# Patient Record
Sex: FEMALE | Race: WHITE | NOT HISPANIC OR LATINO | Employment: FULL TIME | URBAN - METROPOLITAN AREA
[De-identification: names, ages, dates, MRNs, and addresses within clinical notes are randomized per-mention and may not be internally consistent; named-entity substitution may affect disease eponyms.]

---

## 2018-05-24 ENCOUNTER — APPOINTMENT (OUTPATIENT)
Dept: LAB | Age: 28
End: 2018-05-24

## 2018-05-24 ENCOUNTER — OFFICE VISIT (OUTPATIENT)
Dept: OCCUPATIONAL MEDICINE | Age: 28
End: 2018-05-24

## 2018-05-24 VITALS
RESPIRATION RATE: 14 BRPM | HEART RATE: 60 BPM | HEIGHT: 66 IN | SYSTOLIC BLOOD PRESSURE: 106 MMHG | BODY MASS INDEX: 25.71 KG/M2 | DIASTOLIC BLOOD PRESSURE: 62 MMHG | WEIGHT: 160 LBS

## 2018-05-24 DIAGNOSIS — Z02.89 VISIT FOR OCCUPATIONAL HEALTH EXAMINATION: ICD-10-CM

## 2018-05-24 DIAGNOSIS — Z02.89 ENCOUNTER FOR OCCUPATIONAL HEALTH EXAMINATION: ICD-10-CM

## 2018-05-24 DIAGNOSIS — Z02.89 HEALTH EXAMINATION OF DEFINED SUBPOPULATION: Primary | ICD-10-CM

## 2018-05-24 PROCEDURE — OH053 WHISPER TEST PERFORMED IN OCC HEALTH: Performed by: PHYSICIAN ASSISTANT

## 2018-05-24 PROCEDURE — OH044 DRUG SCREEN HAIR COLLECTION ONLY: Performed by: PHYSICIAN ASSISTANT

## 2018-05-24 PROCEDURE — OH138 COMPREHENSIVE EYE EXAM: Performed by: PHYSICIAN ASSISTANT

## 2018-05-24 PROCEDURE — 36415 COLL VENOUS BLD VENIPUNCTURE: CPT | Performed by: PHYSICIAN ASSISTANT

## 2018-05-24 PROCEDURE — OH021 PRE PLACEMENT PHYSICAL EXAM: Performed by: PHYSICIAN ASSISTANT

## 2018-05-31 ENCOUNTER — APPOINTMENT (OUTPATIENT)
Dept: OCCUPATIONAL MEDICINE | Age: 28
End: 2018-05-31

## 2018-09-23 ENCOUNTER — WALK IN (OUTPATIENT)
Dept: URGENT CARE | Age: 28
End: 2018-09-23

## 2018-09-23 VITALS
WEIGHT: 155.87 LBS | HEART RATE: 68 BPM | OXYGEN SATURATION: 97 % | RESPIRATION RATE: 19 BRPM | TEMPERATURE: 98.4 F | HEIGHT: 65 IN | DIASTOLIC BLOOD PRESSURE: 70 MMHG | SYSTOLIC BLOOD PRESSURE: 139 MMHG | BODY MASS INDEX: 25.97 KG/M2

## 2018-09-23 DIAGNOSIS — L03.011 PARONYCHIA OF RIGHT THUMB: Primary | ICD-10-CM

## 2018-09-23 PROCEDURE — 26010 DRAINAGE OF FINGER ABSCESS: CPT | Performed by: FAMILY MEDICINE

## 2018-09-23 PROCEDURE — 99213 OFFICE O/P EST LOW 20 MIN: CPT | Performed by: FAMILY MEDICINE

## 2018-09-23 RX ORDER — CEPHALEXIN 500 MG/1
500 CAPSULE ORAL 4 TIMES DAILY
Qty: 28 CAPSULE | Refills: 0 | Status: SHIPPED | OUTPATIENT
Start: 2018-09-23 | End: 2018-09-30

## 2018-09-26 ENCOUNTER — TELEPHONE (OUTPATIENT)
Dept: TELEHEALTH | Age: 28
End: 2018-09-26

## 2018-12-05 ENCOUNTER — OFFICE VISIT (OUTPATIENT)
Dept: OBGYN | Age: 28
End: 2018-12-05

## 2018-12-05 ENCOUNTER — LAB SERVICES (OUTPATIENT)
Dept: OBGYN | Age: 28
End: 2018-12-05

## 2018-12-05 VITALS
DIASTOLIC BLOOD PRESSURE: 82 MMHG | BODY MASS INDEX: 26.05 KG/M2 | WEIGHT: 156.53 LBS | HEART RATE: 75 BPM | SYSTOLIC BLOOD PRESSURE: 108 MMHG

## 2018-12-05 DIAGNOSIS — Z11.3 SCREENING FOR STDS (SEXUALLY TRANSMITTED DISEASES): ICD-10-CM

## 2018-12-05 DIAGNOSIS — Z01.419 ENCOUNTER FOR GYNECOLOGICAL EXAMINATION WITHOUT ABNORMAL FINDING: Primary | ICD-10-CM

## 2018-12-05 PROCEDURE — 36415 COLL VENOUS BLD VENIPUNCTURE: CPT | Performed by: NURSE PRACTITIONER

## 2018-12-05 PROCEDURE — 87624 HPV HI-RISK TYP POOLED RSLT: CPT | Performed by: INTERNAL MEDICINE

## 2018-12-05 PROCEDURE — 99385 PREV VISIT NEW AGE 18-39: CPT | Performed by: NURSE PRACTITIONER

## 2018-12-05 PROCEDURE — 86803 HEPATITIS C AB TEST: CPT | Performed by: INTERNAL MEDICINE

## 2018-12-05 PROCEDURE — 86695 HERPES SIMPLEX TYPE 1 TEST: CPT | Performed by: INTERNAL MEDICINE

## 2018-12-05 PROCEDURE — 86592 SYPHILIS TEST NON-TREP QUAL: CPT | Performed by: INTERNAL MEDICINE

## 2018-12-05 PROCEDURE — 88175 CYTOPATH C/V AUTO FLUID REDO: CPT | Performed by: PATHOLOGY

## 2018-12-05 PROCEDURE — 87591 N.GONORRHOEAE DNA AMP PROB: CPT | Performed by: INTERNAL MEDICINE

## 2018-12-05 PROCEDURE — 87491 CHLMYD TRACH DNA AMP PROBE: CPT | Performed by: INTERNAL MEDICINE

## 2018-12-05 PROCEDURE — 86696 HERPES SIMPLEX TYPE 2 TEST: CPT | Performed by: INTERNAL MEDICINE

## 2018-12-05 PROCEDURE — 87340 HEPATITIS B SURFACE AG IA: CPT | Performed by: INTERNAL MEDICINE

## 2018-12-05 PROCEDURE — 99000 SPECIMEN HANDLING OFFICE-LAB: CPT | Performed by: NURSE PRACTITIONER

## 2018-12-05 PROCEDURE — 87389 HIV-1 AG W/HIV-1&-2 AB AG IA: CPT | Performed by: INTERNAL MEDICINE

## 2018-12-05 RX ORDER — PHENOL 1.4 %
1 AEROSOL, SPRAY (ML) MUCOUS MEMBRANE AT BEDTIME
COMMUNITY

## 2018-12-05 RX ORDER — DIPHENHYDRAMINE HCL 25 MG
50 CAPSULE ORAL PRN
COMMUNITY

## 2018-12-05 ASSESSMENT — ENCOUNTER SYMPTOMS
CONSTITUTIONAL NEGATIVE: 1
HEMATOLOGIC/LYMPHATIC NEGATIVE: 1
NEUROLOGICAL NEGATIVE: 1
GASTROINTESTINAL NEGATIVE: 1
EYES NEGATIVE: 1
RESPIRATORY NEGATIVE: 1
PSYCHIATRIC NEGATIVE: 1
ENDOCRINE NEGATIVE: 1
ALLERGIC/IMMUNOLOGIC NEGATIVE: 1

## 2018-12-06 ENCOUNTER — TELEPHONE (OUTPATIENT)
Dept: OBGYN | Age: 28
End: 2018-12-06

## 2018-12-06 LAB
C TRACH RRNA SPEC QL NAA+PROBE: NEGATIVE
HBV SURFACE AG SER QL: NEGATIVE
HCV AB SER QL: NEGATIVE
HIV 1+2 AB+HIV1 P24 AG SERPL QL IA: NONREACTIVE
N GONORRHOEA RRNA SPEC QL NAA+PROBE: NEGATIVE
RPR SER QL: NONREACTIVE
SPECIMEN SOURCE: NORMAL

## 2018-12-07 LAB
HSV1 GG IGG SER-ACNC: NEGATIVE
HSV2 GG IGG SER-ACNC: NEGATIVE

## 2018-12-12 ENCOUNTER — E-ADVICE (OUTPATIENT)
Dept: OBGYN | Age: 28
End: 2018-12-12

## 2018-12-12 LAB — HPV16+18+45 E6+E7MRNA CVX NAA+PROBE: NORMAL

## 2018-12-13 ENCOUNTER — TELEPHONE (OUTPATIENT)
Dept: OBGYN | Age: 28
End: 2018-12-13

## 2020-04-14 ENCOUNTER — APPOINTMENT (OUTPATIENT)
Dept: LAB | Age: 30
End: 2020-04-14

## 2020-04-14 ENCOUNTER — LAB SERVICES (OUTPATIENT)
Dept: LAB | Age: 30
End: 2020-04-14

## 2020-04-14 DIAGNOSIS — Z20.822 EXPOSURE TO COVID-19 VIRUS: Primary | ICD-10-CM

## 2020-04-14 DIAGNOSIS — Z20.822 EXPOSURE TO COVID-19 VIRUS: ICD-10-CM

## 2020-04-14 PROCEDURE — U0003 INFECTIOUS AGENT DETECTION BY NUCLEIC ACID (DNA OR RNA); SEVERE ACUTE RESPIRATORY SYNDROME CORONAVIRUS 2 (SARS-COV-2) (CORONAVIRUS DISEASE [COVID-19]), AMPLIFIED PROBE TECHNIQUE, MAKING USE OF HIGH THROUGHPUT TECHNOLOGIES AS DESCRIBED BY CMS-2020-01-R: HCPCS | Performed by: INTERNAL MEDICINE

## 2020-04-15 ENCOUNTER — TELEPHONE (OUTPATIENT)
Dept: OTHER | Age: 30
End: 2020-04-15

## 2020-04-15 LAB
ISOLATION GUIDELINES: NORMAL
SARS-COV-2 RNA RESP QL NAA+PROBE: NOT DETECTED

## 2020-10-28 ENCOUNTER — LAB SERVICES (OUTPATIENT)
Dept: LAB | Age: 30
End: 2020-10-28

## 2020-10-28 ENCOUNTER — EMPLOYEE HEALTH (OUTPATIENT)
Dept: OTHER | Age: 30
End: 2020-10-28

## 2020-10-28 DIAGNOSIS — Z20.822 SUSPECTED COVID-19 VIRUS INFECTION: Primary | ICD-10-CM

## 2020-10-28 DIAGNOSIS — Z20.822 SUSPECTED COVID-19 VIRUS INFECTION: ICD-10-CM

## 2020-10-28 PROCEDURE — U0003 INFECTIOUS AGENT DETECTION BY NUCLEIC ACID (DNA OR RNA); SEVERE ACUTE RESPIRATORY SYNDROME CORONAVIRUS 2 (SARS-COV-2) (CORONAVIRUS DISEASE [COVID-19]), AMPLIFIED PROBE TECHNIQUE, MAKING USE OF HIGH THROUGHPUT TECHNOLOGIES AS DESCRIBED BY CMS-2020-01-R: HCPCS | Performed by: INTERNAL MEDICINE

## 2020-10-29 ENCOUNTER — EMPLOYEE HEALTH (OUTPATIENT)
Dept: OTHER | Age: 30
End: 2020-10-29

## 2020-10-29 LAB
SARS-COV-2 RNA RESP QL NAA+PROBE: NOT DETECTED
SERVICE CMNT-IMP: NORMAL
SERVICE CMNT-IMP: NORMAL

## 2020-12-18 ENCOUNTER — IMMUNIZATION (OUTPATIENT)
Dept: LAB | Age: 30
End: 2020-12-18

## 2020-12-18 DIAGNOSIS — Z23 NEED FOR VACCINATION: Primary | ICD-10-CM

## 2020-12-18 PROCEDURE — 91300 COVID 19 PFIZER-BIONTECH: CPT | Performed by: PREVENTIVE MEDICINE

## 2020-12-18 PROCEDURE — 0001A COVID 19 PFIZER-BIONTECH: CPT | Performed by: PREVENTIVE MEDICINE

## 2021-01-06 ENCOUNTER — IMMUNIZATION (OUTPATIENT)
Dept: LAB | Age: 31
End: 2021-01-06

## 2021-01-06 DIAGNOSIS — Z23 NEED FOR VACCINATION: Primary | ICD-10-CM

## 2021-01-06 PROCEDURE — 0002A COVID 19 PFIZER-BIONTECH: CPT

## 2021-01-06 PROCEDURE — 91300 COVID 19 PFIZER-BIONTECH: CPT

## 2021-03-24 ENCOUNTER — EMPLOYEE HEALTH (OUTPATIENT)
Dept: OTHER | Age: 31
End: 2021-03-24

## 2021-05-20 ENCOUNTER — TRANSFERRED RECORDS (OUTPATIENT)
Dept: HEALTH INFORMATION MANAGEMENT | Facility: CLINIC | Age: 31
End: 2021-05-20
Payer: COMMERCIAL

## 2021-11-05 ENCOUNTER — OFFICE VISIT (OUTPATIENT)
Dept: FAMILY MEDICINE | Facility: CLINIC | Age: 31
End: 2021-11-05
Payer: COMMERCIAL

## 2021-11-05 VITALS
TEMPERATURE: 98.8 F | DIASTOLIC BLOOD PRESSURE: 77 MMHG | HEART RATE: 75 BPM | WEIGHT: 174.4 LBS | BODY MASS INDEX: 28.03 KG/M2 | SYSTOLIC BLOOD PRESSURE: 120 MMHG | HEIGHT: 66 IN | RESPIRATION RATE: 16 BRPM | OXYGEN SATURATION: 98 %

## 2021-11-05 DIAGNOSIS — R06.83 SNORES: ICD-10-CM

## 2021-11-05 DIAGNOSIS — F33.1 MODERATE EPISODE OF RECURRENT MAJOR DEPRESSIVE DISORDER (H): ICD-10-CM

## 2021-11-05 DIAGNOSIS — R61 NIGHT SWEATS: ICD-10-CM

## 2021-11-05 DIAGNOSIS — G47.00 INSOMNIA, UNSPECIFIED TYPE: ICD-10-CM

## 2021-11-05 DIAGNOSIS — F41.1 GENERALIZED ANXIETY DISORDER: Primary | ICD-10-CM

## 2021-11-05 LAB — TSH SERPL DL<=0.005 MIU/L-ACNC: 0.61 MU/L (ref 0.4–4)

## 2021-11-05 PROCEDURE — 36415 COLL VENOUS BLD VENIPUNCTURE: CPT | Performed by: STUDENT IN AN ORGANIZED HEALTH CARE EDUCATION/TRAINING PROGRAM

## 2021-11-05 PROCEDURE — 99385 PREV VISIT NEW AGE 18-39: CPT | Mod: GC | Performed by: STUDENT IN AN ORGANIZED HEALTH CARE EDUCATION/TRAINING PROGRAM

## 2021-11-05 PROCEDURE — 84443 ASSAY THYROID STIM HORMONE: CPT | Performed by: STUDENT IN AN ORGANIZED HEALTH CARE EDUCATION/TRAINING PROGRAM

## 2021-11-05 PROCEDURE — 99213 OFFICE O/P EST LOW 20 MIN: CPT | Mod: 25 | Performed by: STUDENT IN AN ORGANIZED HEALTH CARE EDUCATION/TRAINING PROGRAM

## 2021-11-05 RX ORDER — TRAZODONE HYDROCHLORIDE 50 MG/1
50 TABLET, FILM COATED ORAL DAILY
COMMUNITY
Start: 2021-10-02 | End: 2021-11-05

## 2021-11-05 RX ORDER — TRAZODONE HYDROCHLORIDE 50 MG/1
50 TABLET, FILM COATED ORAL AT BEDTIME
Qty: 90 TABLET | Refills: 1 | Status: SHIPPED | OUTPATIENT
Start: 2021-11-05 | End: 2023-06-01

## 2021-11-05 ASSESSMENT — ANXIETY QUESTIONNAIRES
3. WORRYING TOO MUCH ABOUT DIFFERENT THINGS: NOT AT ALL
1. FEELING NERVOUS, ANXIOUS, OR ON EDGE: SEVERAL DAYS
2. NOT BEING ABLE TO STOP OR CONTROL WORRYING: SEVERAL DAYS
6. BECOMING EASILY ANNOYED OR IRRITABLE: NOT AT ALL
5. BEING SO RESTLESS THAT IT IS HARD TO SIT STILL: NEARLY EVERY DAY
7. FEELING AFRAID AS IF SOMETHING AWFUL MIGHT HAPPEN: SEVERAL DAYS
GAD7 TOTAL SCORE: 7
IF YOU CHECKED OFF ANY PROBLEMS ON THIS QUESTIONNAIRE, HOW DIFFICULT HAVE THESE PROBLEMS MADE IT FOR YOU TO DO YOUR WORK, TAKE CARE OF THINGS AT HOME, OR GET ALONG WITH OTHER PEOPLE: VERY DIFFICULT

## 2021-11-05 ASSESSMENT — PATIENT HEALTH QUESTIONNAIRE - PHQ9
5. POOR APPETITE OR OVEREATING: SEVERAL DAYS
SUM OF ALL RESPONSES TO PHQ QUESTIONS 1-9: 13

## 2021-11-05 ASSESSMENT — MIFFLIN-ST. JEOR: SCORE: 1532.82

## 2021-11-05 NOTE — PATIENT INSTRUCTIONS
"Thanks for coming in today!     1) Anxiety and Depression and Insomnia  - Continue fluoxetine and trazadone as prescribed.   - Find a therapist. Go to [facility]... or find one however you want to.   - Anticipate call from [facility] sometime next week to schedule appointment with psychiatrist.   - Look into \"[employer]\" elder   - Consider increasing fluoxetine to 40mg vs adding on hyroxyzine as needed for anxiety vs switching to wellbutrin-- those are just a few options for pharmacotherapy augmentation    2) Follow-up with me in 1-3 months. Get Pap test at that time. Follow-up on your mood. See if you have a therapist or not. See if you've been evaluated by psych yet or not.         Patient Education   Here is the plan from today's visit    1. Generalized anxiety disorder  - MENTAL HEALTH REFERRAL  - Adult; Psychiatry; Psychiatry; [facility] [phone]; We will contact you to schedule the appointment or please call with any questions; Future  - FLUoxetine (PROZAC) 20 MG capsule; Take 1 capsule (20 mg) by mouth daily  Dispense: 90 capsule; Refill: 1    2. Moderate episode of recurrent major depressive disorder (H)  - MENTAL HEALTH REFERRAL  - Adult; Psychiatry; Psychiatry; [facility] [phone]; We will contact you to schedule the appointment or please call with any questions; Future  - FLUoxetine (PROZAC) 20 MG capsule; Take 1 capsule (20 mg) by mouth daily  Dispense: 90 capsule; Refill: 1    3. Night sweats  - TSH with free T4 reflex; Future    4. Insomnia, unspecified type  - traZODone (DESYREL) 50 MG tablet; Take 1 tablet (50 mg) by mouth At Bedtime  Dispense: 90 tablet; Refill: 1    Please call or return to clinic if your symptoms don't go away.    Follow up plan  No follow-ups on file.    Thank you for coming to [facility] today.  Lab Testing:  **If you had lab testing today and your results are reassuring or normal they will be mailed to you or sent through " [employer] within 7 days.   **If the lab tests need quick action we will call you with the results.  **If you are having labs done on a different day, please call [phone] to schedule at [facility] or [phone] for other [facility] locations. Labs do not offer walk-in appointments.  The phone number we will call with results is # [phone] (home) . If this is not the best number please call our clinic and change the number.  Medication Refills:  If you need any refills please call your pharmacy and they will contact us.   If you need to  your refill at a new pharmacy, please contact the new pharmacy directly. The new pharmacy will help you get your medications transferred faster.   Scheduling:  If you have any concerns about today's visit or wish to schedule another appointment please call our office during normal business hours [phone] (8-5:00 M-F)  If a referral was made to a [facility] and you don't get a call from central scheduling please call [phone].  If a Mammogram was ordered for you at [facility] call [phone] to schedule or change your appointment.  If you had an EKG/XRay/CT/Ultrasound/MRI ordered the number is [phone] to schedule or change your radiology appointment.   [facility] has limited ultrasound appointments available on Wednesdays, if you would like your ultrasound at [facility], please call [phone] to schedule.   Medical Concerns:  If you have urgent medical concerns please call [phone] at any time of the day.    Briana Barker MD

## 2021-11-05 NOTE — PROGRESS NOTES
Female Physical Note        HPI       Moved here from [place] in May. Partner, Alanna, is a first year IM resident at [employer] (hence the move here). Works as an OR nurse at [employer].     Night sweats come and go. At one point, lasted an entire month. No associated nausea, vomiting, or true fever. No cough. No family history of cancer at young ages. No family history of primary ovarian insufficiency.     Mental health journey: Meds she's tried: Celexa, sertraline, lexapro, effexor (heart racing, dizzy).   Recovering alcoholic--drank heavily age [age]--hasn't drank since. Was Vyvanse for a period of time, unclear indication. Gail was struggling a lot with social interactions, train of thought, focus, make meaningful social contacts with people. Thinks she did ADHD evaluation, but not sure. Also trialed gabapentin for period of time.     Currently on fluoxetine and trazadone. Fluoxetine 20mg daily. Has been on this since ~2018. Wants to invest more energy into doing some therapy and finding a psychiatrist. Hasn't had therapist recently. Last time was in 2011 or 2012.     Trazadone 75mg nightly as needed for sleep-- usually uses 50mg instead-- takes ~5 nights per week. Works great to help her sleep. Had tried some other OTC concoctions to help w/ sleep-- tough period of time b/c was on Vyvanse and working nights--so plenty of factors to alter sleep cycle.     Smoked age [age]. 1ppd. Quit w/ nicotine patches and nicotine gum. 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     Patient Active Problem List   Diagnosis     Generalized anxiety disorder     Moderate episode of recurrent major depressive disorder (H)     Snores     History reviewed. No pertinent past medical history.    Previous Medical Care   Filled out CAROLINE for access to records from [place]     Family History   Problem Relation Age of Onset     Hypertension Mother      Hyperlipidemia Mother      Hypertension Father       Heart Disease Maternal Grandmother             Review of Systems:     Review of Systems:  CONSTITUTIONAL: NEGATIVE for fever, chills, change in weight  INTEGUMENTARY/SKIN: NEGATIVE for worrisome rashes, moles or lesions  EYES: NEGATIVE for vision changes or irritation  ENT/MOUTH: NEGATIVE for ear, mouth and throat problems  RESP: NEGATIVE for significant cough or SOB  BREAST: NEGATIVE for masses, tenderness or discharge  CV: NEGATIVE for chest pain, palpitations or peripheral edema  GI: NEGATIVE for nausea, abdominal pain, heartburn, or change in bowel habits  : NEGATIVE for frequency, dysuria, or hematuria  MUSCULOSKELETAL: NEGATIVE for significant arthralgias or myalgia  NEURO: NEGATIVE for weakness, dizziness or paresthesias  ENDOCRINE: See above   HEME/ALLERGY: NEGATIVE for bleeding problems  PSYCHIATRIC: NEGATIVE for changes in mood or affect    Sleep:   Do you snore most or the night (as reported by a family member)? Yes  Do you feel sleepy or extremely tired during most of the day? Yes         Social History     Social History     Socioeconomic History     Marital status: Single     Spouse name: Not on file     Number of children: Not on file     Years of education: Not on file     Highest education level: Not on file   Occupational History     Not on file   Tobacco Use     Smoking status: Former Smoker     Smokeless tobacco: Never Used   Vaping Use     Vaping Use: Never used   Substance and Sexual Activity     Alcohol use: Never     Drug use: Yes     Types: Marijuana     Sexual activity: Yes     Partners: Female     Birth control/protection: None   Other Topics Concern     Not on file   Social History Narrative     Not on file     Social Determinants of Health     Financial Resource Strain: Not on file   Food Insecurity: Not on file   Transportation Needs: Not on file   Physical Activity: Not on file   Stress: Not on file   Social Connections: Not on file   Intimate Partner Violence: Not on file  "  Housing Stability: Not on file       Marital Status:Single  Who lives in your household? Partner, Alanna    Has anyone hurt you physically, for example by pushing, hitting, slapping or kicking you or forcing you to have sex? Denies  Do you feel threatened or controlled by a partner, ex-partner or anyone in your life? Denies    Sexual Health     Sexual concerns: No   STI History: Neg  Pregnancy History: No obstetric history on file.  LMP Patient's last menstrual period was 11/02/2021 (exact date). Has Left sided pelvic pain w/ periods. Periods last 6 days. Regular-- once per month. Uses a cup-- kind of heavy for 2-3 days.   Last Pap Smear Date: No results found for: PAP. Reports normal in 2018.   Abnormal Pap History: None    Recommended Screening     Due for PAP.          Physical Exam:     Vitals: /77   Pulse 75   Temp 98.8  F (37.1  C) (Oral)   Resp 16   Ht 1.676 m (5' 6\")   Wt 79.1 kg (174 lb 6.4 oz)   LMP 11/02/2021 (Exact Date)   SpO2 98%   Breastfeeding No   BMI 28.15 kg/m    BMI= Body mass index is 28.15 kg/m .   GENERAL: healthy, alert and no distress, warm to the touch  EYES: Eyes grossly normal to inspection, extraocular movements - intact, and PERRL  HENT: ear canals- normal; TMs- normal; Nose- normal;  NECK: no asymmetry, no masses,   RESP: lungs clear to auscultation - no rales, no rhonchi, no wheezes  BREAST:deferred exam  CV: regular rates and rhythm, normal S1 S2, no S3 or S4 and no murmur, no click or rub -  ABDOMEN: soft, no tenderness, no  hepatosplenomegaly, no masses, normal bowel sounds  MS: extremities- no gross deformities noted, no edema  SKIN: no suspicious lesions, no rashes  NEURO: strength and tone- normal, sensory exam- grossly normal, mentation- intact, speech- normal, reflexes- symmetric  BACK: no CVA tenderness, no paralumbar tenderness  - deferred  PSYCH: Alert and oriented times 3; speech- coherent , normal rate and volume; able to articulate logical thoughts, able " to abstract reason, no tangential thoughts, no hallucinations or delusions, affect- tearful at times    PHQ 11/5/2021   PHQ-9 Total Score 13   Q9: Thoughts of better off dead/self-harm past 2 weeks Not at all     ROSALVA-7 SCORE 11/5/2021   Total Score 7     Assessment and Plan     Gail is a 32 yo F establishing care here at [facility]. Has history of ROSALVA, MDD, and insomnia, but is otherwise healthy.     ROSALVA, MDD (Currently Moderate Episode), Insomnia  Has trialed many different meds in the past (see HPI). Currently on 20mg prozac daily + 50mg trazadone at bedtime PRN. Overall still feeling depressed with low motivation. Hasn't seen therapist before, but open to it. Discussed options for improving mood including: med adjustment (offered increase prozac vs add on wellbutrin vs add on PRN hydroxyzine) vs non-pharmacologic interventions (3 good things gratitude journal, physical activity, therapy). Patient interested in non-pharmacologic interventions for now,. She would like to see psychiatry for formal/comprehensive assessment, diagnosis, and at least preliminary management. Note: there was some question of ADHD at one point in her life. No thoughts SI/HI. Did obtain TSH, which was normal.   - Offered to place  referral for therapy ; but patient prefers to find therapist on her own. If she hasn't found one by our next meeting, then she is open to me placing  referral.,   - Placed Psych referral (see above) per patient's request.    - Consider sleep study in future; larry if mood/anxiety/depression hard to straighten out. (Has known insomnia, but also screens positive for possible EMILY.)  - Refilled prozac and trazadone  - Follow-up with me in one month    Health maintenance  Thinks she's had negative HIV/HepC screening.   Due for Pap. Plan on obtaining this at next visit (time limitations today).   Contraception not indicated (no sperm-containing partners).   Up to date on covid shots and flu shots.     Follow-up with me  in one month. Get PAP then. Follow-up on mental health as well.     Options for treatment and follow-up care were reviewed with the patient . Ronda Segura and/or guardian engaged in the decision making process and verbalized understanding of the options discussed and agreed with the final plan.    Briana Barker MD

## 2021-11-05 NOTE — LETTER
November 6, 2021      Ronda Segura  [address] [address]        Robbi Reynolds,       Thank you for getting your care at [facility]. Please see below for your test results. Your thyroid test is normal. I'm glad to see you got an appointment scheduled with me on 12/3.     Resulted Orders   TSH with free T4 reflex   Result Value Ref Range    TSH 0.61 0.40 - 4.00 mU/L       If you have any concerns about these results please call and leave a message for me or send a [employer] message to the clinic.    Sincerely,    Briana Barker MD

## 2021-11-06 ASSESSMENT — ANXIETY QUESTIONNAIRES: GAD7 TOTAL SCORE: 7

## 2021-11-08 PROBLEM — R06.83 SNORES: Status: ACTIVE | Noted: 2021-11-08

## 2021-11-11 NOTE — PROGRESS NOTES
Preceptor Attestation:   Patient seen, evaluated and discussed with the resident. I have verified the content of the note, which accurately reflects my assessment of the patient and the plan of care.   Supervising Physician:  Claudia Owens, DO

## 2021-12-03 ENCOUNTER — OFFICE VISIT (OUTPATIENT)
Dept: FAMILY MEDICINE | Facility: CLINIC | Age: 31
End: 2021-12-03
Payer: COMMERCIAL

## 2021-12-03 VITALS
OXYGEN SATURATION: 98 % | WEIGHT: 180 LBS | RESPIRATION RATE: 16 BRPM | SYSTOLIC BLOOD PRESSURE: 135 MMHG | BODY MASS INDEX: 29.05 KG/M2 | DIASTOLIC BLOOD PRESSURE: 75 MMHG | HEART RATE: 63 BPM | TEMPERATURE: 97.9 F

## 2021-12-03 DIAGNOSIS — Z00.00 HEALTHCARE MAINTENANCE: Primary | ICD-10-CM

## 2021-12-03 DIAGNOSIS — F41.1 GENERALIZED ANXIETY DISORDER: ICD-10-CM

## 2021-12-03 DIAGNOSIS — F33.1 MODERATE EPISODE OF RECURRENT MAJOR DEPRESSIVE DISORDER (H): ICD-10-CM

## 2021-12-03 DIAGNOSIS — F90.9 ATTENTION DEFICIT HYPERACTIVITY DISORDER (ADHD), UNSPECIFIED ADHD TYPE: ICD-10-CM

## 2021-12-03 PROCEDURE — 99213 OFFICE O/P EST LOW 20 MIN: CPT | Mod: GC | Performed by: STUDENT IN AN ORGANIZED HEALTH CARE EDUCATION/TRAINING PROGRAM

## 2021-12-03 PROCEDURE — G0145 SCR C/V CYTO,THINLAYER,RESCR: HCPCS | Performed by: STUDENT IN AN ORGANIZED HEALTH CARE EDUCATION/TRAINING PROGRAM

## 2021-12-03 ASSESSMENT — PATIENT HEALTH QUESTIONNAIRE - PHQ9: 5. POOR APPETITE OR OVEREATING: SEVERAL DAYS

## 2021-12-03 ASSESSMENT — ANXIETY QUESTIONNAIRES
2. NOT BEING ABLE TO STOP OR CONTROL WORRYING: MORE THAN HALF THE DAYS
7. FEELING AFRAID AS IF SOMETHING AWFUL MIGHT HAPPEN: NOT AT ALL
1. FEELING NERVOUS, ANXIOUS, OR ON EDGE: MORE THAN HALF THE DAYS
GAD7 TOTAL SCORE: 9
3. WORRYING TOO MUCH ABOUT DIFFERENT THINGS: NOT AT ALL
6. BECOMING EASILY ANNOYED OR IRRITABLE: SEVERAL DAYS
5. BEING SO RESTLESS THAT IT IS HARD TO SIT STILL: NEARLY EVERY DAY

## 2021-12-03 NOTE — PROGRESS NOTES
"[facility] Note  [facility]     Assessment and Plan   Ronda Segura is a 31 year old who presented to clinic today for :     HM:  Cervical cancer screening- Pap Smear w/ reflex to HPV. Result NILM. Next due in 2024, then q5 years if normal in 2024.     ROSALVA (Score today 9 from 7 in Nov)   MDD, moderate, recurrent (score today 5 from 13 in Nov), no SI/HI  H/o ADHD  Patient hasn't been able to find therapist yet. Called a couple but hasn't found one that works w/ insurance and her schedule. Hasn't gotten her hands on [place]. See my prior note as well. Referred for ADHD testing-- discussed w/ her that wait time is several months. Continue prozac and trazadone. Continue working to find therapist as well.       Follow-up with me in 6 months re: depression, anxiety, and to follow-up on ADHD eval         HPI       Ronda Segura is a 31 year old who presents for RECHECK (f/u adhd, pap )  Has tried finding therapist, but hasn't found one yet.   Our  hasn't called her yet.   Wanting ADHD eval.   States mood feels more stable today than during last visit.   Please see my 11/5/21 note for further details.  PHQ 11/5/2021 12/3/2021   PHQ-9 Total Score 13 5   Q9: Thoughts of better off dead/self-harm past 2 weeks Not at all Not at all     ROSALVA-7 SCORE 11/5/2021 12/3/2021   Total Score 7 9              Physical Exam:   /75   Pulse 63   Temp 97.9  F (36.6  C) (Oral)   Resp 16   Wt 81.6 kg (180 lb)   LMP 12/02/2021 (Exact Date)   SpO2 98%   Breastfeeding No   BMI 29.05 kg/m      : Vulvar tissue appears healthy w/o lesions. Vagina well-ruggated. Cervix appears normal.   Psych:   - Casually dressed.   - Engages appropriately. Speech normal in pace/volume. Content appropriate.  - Mood \"pretty good, better than last time.\" Affect stable/congruent w/ mood.   - No SI/HI.       Results:     Results for orders placed or performed in visit on 12/03/21 "   Pap screen reflex to HPV if ASCUS - recommend age 25 - 29     Status: None   Result Value Ref Range    Interpretation        Negative for Intraepithelial Lesion or Malignancy (NILM)    Comment         Papanicolaou Test Limitations:  Cervical cytology is a screening test with limited sensitivity, and regular screening is critical for cancer prevention.  Pap tests are primarily effective for the diagnosis/prevention of squamous cell carcinoma, not adenocarcinoma or other cancers.        Specimen Adequacy       Satisfactory for evaluation, endocervical/transformation zone component present    Clinical Information       none      HPV Reflex Yes if ASCUS     Previous Abnormal?       No      Performing Labs       The technical component of this testing was completed at [facility]       Options for treatment and follow-up care were reviewed with the patient. Ronda Segura engaged in the decision making process and verbalized understanding of the options discussed and agreed with the final plan.    Briana Barker MD, she/her, PGY-3, [facility]

## 2021-12-03 NOTE — PATIENT INSTRUCTIONS
- Pap results should be back sometime next week-- will send via [employer]  - See the number below to call for psych eval  - Follow-up in 6 months to a year re: depression/anxiety

## 2021-12-03 NOTE — PROGRESS NOTES
Preceptor Attestation:    I discussed the patient with the resident and evaluated the patient in person. I personally viewed the EKG and agree with the interpretation documented by the resident. I have verified the content of the note, which accurately reflects my assessment of the patient and the plan of care.   Supervising Physician:  Tonia Benedict MD.

## 2021-12-04 ASSESSMENT — PATIENT HEALTH QUESTIONNAIRE - PHQ9: SUM OF ALL RESPONSES TO PHQ QUESTIONS 1-9: 5

## 2021-12-04 ASSESSMENT — ANXIETY QUESTIONNAIRES: GAD7 TOTAL SCORE: 9

## 2021-12-07 ENCOUNTER — TELEPHONE (OUTPATIENT)
Dept: PSYCHOLOGY | Facility: CLINIC | Age: 31
End: 2021-12-07
Payer: COMMERCIAL

## 2021-12-07 LAB
BKR LAB AP GYN ADEQUACY: NORMAL
BKR LAB AP GYN INTERPRETATION: NORMAL
BKR LAB AP HPV REFLEX: NORMAL
BKR LAB AP PREVIOUS ABNORMAL: NORMAL
PATH REPORT.COMMENTS IMP SPEC: NORMAL
PATH REPORT.COMMENTS IMP SPEC: NORMAL
PATH REPORT.RELEVANT HX SPEC: NORMAL

## 2021-12-07 NOTE — TELEPHONE ENCOUNTER
Psychiatry Consult Referral:  Please schedule this patient with Dr. Tolbert for a virtual visit.     This is for a one time consult only, not for ongoing psychiatric care.  She will provide recommendations to the PCP for ongoing care.      ##Please let the patient know that this is an educational consult clinic.  For that reason, Dr. Tolbert and a resident will be seeing the patient together virtually. ## Please emphasize this to the patient.     If you are unable to reach the patient after two phone calls, please send a letter and close this encounter.

## 2021-12-12 ENCOUNTER — HEALTH MAINTENANCE LETTER (OUTPATIENT)
Age: 31
End: 2021-12-12

## 2022-06-07 ENCOUNTER — FCC EXTENDED DOCUMENTATION (OUTPATIENT)
Dept: PSYCHOLOGY | Facility: CLINIC | Age: 32
End: 2022-06-07
Payer: COMMERCIAL

## 2022-06-07 ENCOUNTER — VIRTUAL VISIT (OUTPATIENT)
Dept: PSYCHOLOGY | Facility: CLINIC | Age: 32
End: 2022-06-07
Payer: COMMERCIAL

## 2022-06-07 DIAGNOSIS — F41.1 GENERALIZED ANXIETY DISORDER: ICD-10-CM

## 2022-06-07 DIAGNOSIS — F33.41 MAJOR DEPRESSIVE DISORDER, RECURRENT EPISODE, IN PARTIAL REMISSION WITH ANXIOUS DISTRESS (H): Primary | ICD-10-CM

## 2022-06-07 DIAGNOSIS — Z13.39 ADHD (ATTENTION DEFICIT HYPERACTIVITY DISORDER) EVALUATION: ICD-10-CM

## 2022-06-07 PROCEDURE — 90834 PSYTX W PT 45 MINUTES: CPT | Mod: 95 | Performed by: PSYCHOLOGIST

## 2022-06-07 ASSESSMENT — ANXIETY QUESTIONNAIRES
7. FEELING AFRAID AS IF SOMETHING AWFUL MIGHT HAPPEN: NOT AT ALL
GAD7 TOTAL SCORE: 6
1. FEELING NERVOUS, ANXIOUS, OR ON EDGE: SEVERAL DAYS
IF YOU CHECKED OFF ANY PROBLEMS ON THIS QUESTIONNAIRE, HOW DIFFICULT HAVE THESE PROBLEMS MADE IT FOR YOU TO DO YOUR WORK, TAKE CARE OF THINGS AT HOME, OR GET ALONG WITH OTHER PEOPLE: VERY DIFFICULT
3. WORRYING TOO MUCH ABOUT DIFFERENT THINGS: SEVERAL DAYS
GAD7 TOTAL SCORE: 6
5. BEING SO RESTLESS THAT IT IS HARD TO SIT STILL: SEVERAL DAYS
6. BECOMING EASILY ANNOYED OR IRRITABLE: MORE THAN HALF THE DAYS
2. NOT BEING ABLE TO STOP OR CONTROL WORRYING: SEVERAL DAYS

## 2022-06-07 ASSESSMENT — COLUMBIA-SUICIDE SEVERITY RATING SCALE - C-SSRS
TOTAL  NUMBER OF ABORTED OR SELF INTERRUPTED ATTEMPTS LIFETIME: NO
6. HAVE YOU EVER DONE ANYTHING, STARTED TO DO ANYTHING, OR PREPARED TO DO ANYTHING TO END YOUR LIFE?: NO
REASONS FOR IDEATION LIFETIME: COMPLETELY TO END OR STOP THE PAIN (YOU COULDN'T GO ON LIVING WITH THE PAIN OR HOW YOU WERE FEELING)
TOTAL  NUMBER OF INTERRUPTED ATTEMPTS LIFETIME: NO
1. HAVE YOU WISHED YOU WERE DEAD OR WISHED YOU COULD GO TO SLEEP AND NOT WAKE UP?: YES
ATTEMPT LIFETIME: NO
1. IN THE PAST MONTH, HAVE YOU WISHED YOU WERE DEAD OR WISHED YOU COULD GO TO SLEEP AND NOT WAKE UP?: NO
2. HAVE YOU ACTUALLY HAD ANY THOUGHTS OF KILLING YOURSELF?: NO

## 2022-06-07 ASSESSMENT — PATIENT HEALTH QUESTIONNAIRE - PHQ9
SUM OF ALL RESPONSES TO PHQ QUESTIONS 1-9: 9
5. POOR APPETITE OR OVEREATING: NOT AT ALL

## 2022-06-07 NOTE — Clinical Note
Thank you for the referral.  I saw the patient today for the first appointment in the ADHD Evaluation and she was diagnosed with anxiety and depression. I will assess for ADHD and route the final diagnoses and recommendations to you when the evaluation is completed.   Please let me know if you have any questions.   Reanna Batista PsyD LP

## 2022-06-07 NOTE — PROGRESS NOTES
[facility]  Provider Name:  Reanna Lester      Credentials:  Shamika DOUGLASS    PATIENT'S NAME: Ronda Segura  PREFERRED NAME: Gail  PRONOUNS:      She/Her/Hers  MRN: [id number]  : 1990  ADDRESS: [address] [place], [place] [id number]  ACCT. NUMBER:  [id number]  DATE OF SERVICE: 2022   START TIME: 9:03AM  END TIME: 10:10AM  PREFERRED PHONE: [phone]  May we leave a program related message: Yes  SERVICE MODALITY:  Video Visit:      Provider verified identity through the following two step process.  Patient provided:  Patient     Telemedicine Visit: The patient's condition can be safely assessed and treated via synchronous audio and visual telemedicine encounter.      Reason for Telemedicine Visit: Services only offered telehealth    Originating Site (Patient Location): Patient's home    Distant Site (Provider Location): Provider Remote Setting- Home Office    Consent:  The patient/guardian has verbally consented to: the potential risks and benefits of telemedicine (video visit) versus in person care; bill my insurance or make self-payment for services provided; and responsibility for payment of non-covered services.     Patient would like the video invitation sent by:  My Chart    Mode of Communication:  Video Conference via Amwell    As the provider I attest to compliance with applicable laws and regulations related to telemedicine.    UNIVERSAL ADULT Mental Health DIAGNOSTIC ASSESSMENT    Identifying Information:  Patient is a 32 year old,   .  The pronoun use throughout this assessment reflects the patient's chosen pronoun.  Patient was referred for an assessment by PCP Briana Barker MD.  Patient attended the session alone.    Chief Complaint:   The purpose of this evaluation is to: provide treatment recommendations and clarify diagnosis. Patient reported seeking services at this time for diagnostic assessment and recommendations for treatment.  Patient  "reported that      has completed a previous ADHD diagnostic assessment at the age of 24. Patient reported that she was diagnosed with ADD and was prescribed Vyvanse. Patient reported that the medication was helpful \"at times.\" Patient reported that the medication helped her start and complete tasks at home. Patient reported that she stopped taking the medication, because she did not want to be on medication.      Patient has received a previous diagnosis of ADD, depression, and anxiety. Patient reported that medication has been prescribed to address these problems.  Patient reported that medication was helpful and did not cause unpleasant side effects.     Effexor \"made me feel insane.\"     Current Medications:  FLUoxetine (PROZAC) 20 MG capsule  traZODone (DESYREL) 50 MG tablet    Patient denied allergies to medications.     Patient reported that she has experienced depression since age 14 and anxiety since age 17.     Patient reported that her partner is a medical resident and they moved to [place] from [place] for her partner's residency last year. 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 Patient reported that her sister lives 4 blocks away from the patient.     Patient reported the following ADHD symptoms: easily distracted, disorganized, difficulty with motivation to begin tasks, difficulty completing tasks, hyperactive, and interrupts. Patient reported that she first became aware of her inattention symptoms at the age of 23 after she became sober.       Social/Family History:  Patient reported they grew up in [place], [place]. They were raised by biological parents. Patient reported that her parents are still together.Patient reported that their childhood was \"great.\"  Patient described their current relationships with family of origin as close with parents. Patient reported that she is the youngest of three daughters. Patient reported that her sisters are 13 " "and 10 years older than she is, so she was raised somewhat like an only child. Patient reported that she is \"really close\" to her oldest sister and gets along with her middle sister, but is not as close with her.     Patient described her childhood family environment as nurturing and stable.  As a child, patient reported that she failed to complete assigned chores in the home environment, had problems with organization and keeping track of items and needed frequent reminders by parents to be motivated or to complete work. Patient reported no difficulty with childhood peer relationships.     The patient describes their cultural background as [facility]  .  Cultural influences and impact on patient's life structure, values, norms, and healthcare: patient works in healthcare  .  Contextual influences on patient's health include: Family Factors family was supportive of mental health care.    These factors will be addressed in the Preliminary Treatment plan.  Patient identified their preferred language to be English  . Patient reported they does not need the assistance of an  or other support involved in therapy.     Patient reported had no significant delays in developmental tasks.   Patient's highest education level was    . Patient identified the following learning problems: none reported.  Modifications will not be used to assist communication in therapy.   Patient reports they are able to understand written materials.    Patient did not receive tutoring services during the school years. Patient did not receive special education services. Patient  reported significant behavior and discipline problems including: frequent tardiness or absences. Patient did attend post secondary school.  Patient reported that she often felt bored, distractible behavior and problems learning new materials. Patient reported that in 11th grade, she started skipping approximately 2 classes each day, because she \"did " "not want to be there.\" Patient reported that she often skipped band and study toney classes.        ADHD Related History:   As a child, Patient reported having regular and consistent sleep patterns. Patient reported currently experiencing sleep disturbance, including: insomnia.  Client reported sleeping approximately 8 hours per night. Patient reported that if she does not take Trazodone, she sleeps for approximately 5-6 hours each night due to primary and middle insomnia. Patient  reported that she has not completed a sleep study.  Patient reported having cravings for sweets.  There are not significant nutritional concerns.  Patient reported engaging in regular exercise.      Patient graduated high school in 2010 with a 3.1 GPA.  During the elementary, middle, and high school years, patient recalls academic strengths in the area of math and science. Patient reported experiencing academic problems in reading and writing.     Patient reported that her challenge to college was difficulty. She reported that with the freedom, she started abusing alcohol and did not always make it to class or work. Patient reported that she failed to turn in assignments.     Patient reported that she started college in fall of 2010 when she attended [employer]. Patient reported that she called her mother in October of 2011 and said that she needed help. She reported that she was \"miserable, grades were bad, and I couldn't stop drinking.\" Patient reported that she then left the college in January of 2012.     Patient reported that in January of 2012, she moved back in with her parents and then started going to school at a technical college. Patient reported that she persistently took classes for approximately 6 years. Patient reported that her first few semesters she did not do well, when she was still drinking alcohol. Patient reported that she thinks that she was placed on academic probation and she was limited to only taking " "classes part time due to her previous grades. 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     Patient reported that after she became sober, she procrastinated with homework for her least preferred coursework.           Patient reported the following relationship history: 4 significant relationships.  Patient's current relationship status is partnered   for two years.   Patient identified their sexual orientation as lesbian/vega/homosexual  .  Patient reported having no   child(seema). Patient identified partner, sister, mother, and AA community   as part of their support system.  Patient identified the quality of these relationships as stable and meaningful.     Patient's current living/housing situation involves living with partner in a rented house . The immediate members of family and household include self and partner  and they report that housing is stable.    Patient is currently employed full time. Patient reports their finances are obtained through employment   . The patient's work history includes: nursing, restaurant industry,  in a hospital, campus gardening, grocery store cafe and meat department, [employer], CNA, and LPN. The longest period of employment has been 3 years.  Client has been terminated from a place of employment. Patient reported that she was terminated three times. Patient reported that she was terminated from two jobs in college when she was abusing alcohol and would \"not show up.\" Patient reported that at approximately age 23, she stopped showing up to a job due to interpersonal conflict with a former partner and her new partner who all worked together.     Patient reported that at approximately age 23, she was \"drunk\" at a restaurant job and she \"challenged\" the owner. Patient reported that the owner asked her to leave and she did not, so the police were called. Patient reported that the police took her home and she told the police officers that her parents abused her, " because she did not want to go home and did not want to get sober. Patient reported that her parents then said that she had to get sober, so she did. Patient reported that at age 23, she joined AA and became sober.              Patient does not   identify finances as a current stressor.      Patient reported that they have not  been involved with the legal system. Patient   being under probation/ parole/ jurisdiction. They are not under any current court jurisdiction.     Patient has received a 's license.  Patient has received moving violations, including: 3 speeding tickets.  Patient reported the following driving habits: experiencces road rage, fails to obey traffic signs and laws, gets lost easily and often exceeds the speed limit / speeds.  According to client, other people are comfortable riding as passengers when she is driving.       Patient's Strengths and Limitations:  Patient identified the following strengths or resources that will help them succeed in treatment: commitment to health and well being, friends / good social support, family support, insight, intelligence and work ethic. Things that may interfere with the patient's success in treatment include: scheduling conflicts.     Assessments:  The following assessments were completed by patient for this visit:  PHQ9:   PHQ-9 SCORE 11/5/2021 12/3/2021 6/7/2022   PHQ-9 Total Score 13 5 9     GAD7:   ROSALVA-7 SCORE 11/5/2021 12/3/2021 6/7/2022   Total Score 7 9 6     CAGE-AID:   CAGE-AID Total Score 6/7/2022   Total Score 0     PROMIS 10-Global Health (all questions and answers displayed):   PROMIS 10 6/17/2022   In general, would you say your health is: Good   In general, would you say your quality of life is: Good   In general, how would you rate your physical health? Fair   In general, how would you rate your mental health, including your mood and your ability to think? Fair   In general, how would you rate your satisfaction with your social  "activities and relationships? Poor   In general, please rate how well you carry out your usual social activities and roles Fair   To what extent are you able to carry out your everyday physical activities such as walking, climbing stairs, carrying groceries, or moving a chair? Completely   How often have you been bothered by emotional problems such as feeling anxious, depressed or irritable? Sometimes   How would you rate your fatigue on average? Moderate   How would you rate your pain on average?   0 = No Pain  to  10 = Worst Imaginable Pain 0   In general, would you say your health is: 3   In general, would you say your quality of life is: 3   In general, how would you rate your physical health? 2   In general, how would you rate your mental health, including your mood and your ability to think? 2   In general, how would you rate your satisfaction with your social activities and relationships? 1   In general, please rate how well you carry out your usual social activities and roles. (This includes activities at home, at work and in your community, and responsibilities as a parent, child, spouse, employee, friend, etc.) 2   To what extent are you able to carry out your everyday physical activities such as walking, climbing stairs, carrying groceries, or moving a chair? 5   In the past 7 days, how often have you been bothered by emotional problems such as feeling anxious, depressed, or irritable? 3   In the past 7 days, how would you rate your fatigue on average? 3   In the past 7 days, how would you rate your pain on average, where 0 means no pain, and 10 means worst imaginable pain? 0   Global Mental Health Score 9   Global Physical Health Score 15   PROMIS TOTAL - SUBSCORES 24     R[place]ford Suicide Severity Rating Scale (Lifetime/Recent)  Rutherford Suicide Severity Rating (Lifetime/Recent) 6/7/2022   1. Wish to be Dead (Lifetime) 1   Wish to be Dead Description (Lifetime) SI started at age 23 when she \"got sober.\" " Patient reported that the last SI was within the last year.   1. Wish to be Dead (Past 1 Month) 0   2. Non-Specific Active Suicidal Thoughts (Lifetime) 0   Most Severe Ideation Rating (Lifetime) 4   Description of Most Severe Ideation (Lifetime) I felt debilitated by my emotions. I was unable to handle what was going on.   Frequency (Lifetime) 1   Duration (Lifetime) 1   Controllability (Lifetime) 1   Deterrents (Lifetime) 1   Reasons for Ideation (Lifetime) 5   Actual Attempt (Lifetime) 0   Has subject engaged in non-suicidal self-injurious behavior? (Lifetime) 0   Interrupted Attempts (Lifetime) 0   Aborted or Self-Interrupted Attempt (Lifetime) 0   Preparatory Acts or Behavior (Lifetime) 0   Calculated C-SSRS Risk Score (Lifetime/Recent) No Risk Indicated       Personal and Family Medical History:  Patient does   report a family history of mental health concerns. Patient reported that her mother's parents abused alcohol and cocaine. Patient reported that her oldest sister used to abuse alcohol. Patient reported that her oldest sister has depression and anxiety.  Patient reports family history includes Heart Disease in her maternal grandmother; Hyperlipidemia in her mother; Hypertension in her father and mother.     Patient does report Mental Health Diagnosis and/or Treatment.  Patient reported the following previous diagnoses which include(s): an Anxiety Disorder and Depression.  Patient reported symptoms began at 14 for depression, age 18 for anxiety, and age 23 for ADHD.  Patient has received mental health services in the past: therapy with Alexandra Ramey in [place], [place] and primary care provider at [facility]. Psychiatric Hospitalizations: None.  Patient denies a history of civil commitment.  Patient is receiving other mental health services.  These include primary care provider at [facility].  For follow-up on TBD.     Patient has had a physical exam to rule out medical causes for current symptoms.  Date of  last physical exam was within the past year. Client was encouraged to follow up with PCP if symptoms were to develop. The patient has a [place] Primary Care Provider, who is named Briana Barker..  Patient reports no current medical concerns and no current dental concerns.  Patient denies any issues with pain..   There are not significant appetite / nutritional concerns / weight changes. However, she reported that she would like to lose weight. Patient does not report a history of head injury / trauma / cognitive impairment.      Current Medications:  FLUoxetine (PROZAC) 20 MG capsule    traZODone (DESYREL) 50 MG tablet      Medication Adherence:  Patient reports taking prescribed medications as prescribed. However, Patient reported that at times she forgets to take the Prozac 2-3 days in a row, until she does not feel well and then re-starts the medication.     Patient Allergies:  No Known Allergies    Medical History:  No past medical history on file.      Current Mental Status Exam:   Appearance:  Appropriate    Eye Contact:  Good   Psychomotor:  Hyperactive       Gait / station:  no problem  Attitude / Demeanor: Cooperative  Interested  Speech      Rate / Production: Normal/ Responsive      Volume:  Normal  volume      Language:  intact  Mood:   Euthymic  Affect:   Appropriate    Thought Content: Clear   Thought Process: Tangential       Associations: No loosening of associations  Insight:   Good   Judgment:  Intact   Orientation:  All  Attention/concentration: Good      Substance Use:  Patient did report a family history of substance use concerns; see medical history section for details.  Patient has not received chemical dependency treatment in the past.  Patient has not ever been to detox.  Patient reported that she worked the [employer]  program ages [age].     Patient is not currently receiving any chemical dependency treatment.           Substance History of use Age of first use Date of last use     Pattern  "and duration of use (include amounts and frequency)   Alcohol  alcohol abuse    18  23 No current use due to history of alcohol abuse.   Cannabis    social use  18 April 2022 Patient reported that she typically uses cannabis in edible form once every 2-3 months.      Amphetamines    Never         Cocaine/crack     social use   20   22 Patient reported that she used in college in social setting and did one line at a time   Hallucinogens    Social use   20   22 Patient reported that she tried LSD and mushrooms in social settings.   Inhalants              Heroin              Other Opiates          Benzodiazepine            Barbiturates          Over the counter meds   Drank robotussin when did not have alcohol      Patient reported that she once drank Robitussin when she did not have access to alcohol.      Caffeine  current use  18  6/17/2022  Patient reported that on work days she typically drinks 2 cups of coffee and one soda. Patient reported that the days she is home, she typically drinks 1.5 cups of coffee, and 2-3 12 ounce sodas daily. Patient reported that she typically drinks one energy drink each month when she is working a long shift.      Nicotine    former  17  28    Other substances not listed above:  Identify:             Patient reported the following problems as a result of their substance use: no current use  .    Substance Use: No symptoms    Based on the negative CAGE score and clinical interview there  are not indications of drug or alcohol abuse.      Significant Losses / Trauma / Abuse / Neglect Issues:   Patient did not serve in the .  There are indications or report of significant loss, trauma, abuse or neglect issues related to: are no indications and client denies any losses, trauma, abuse, or neglect concerns.  However, Patient reported that she lost friends when she became sober at age 23. Patient reported that she \"did and said things that were very hurtful\" to high school " friends.      Concerns for possible neglect are not present.     Safety Assessment:   Patient denies current homicidal ideation and behaviors.  Patient denies current self-injurious ideation and behaviors.    Patient denied risk behaviors associated with substance use.  Patient reported impulsive/compulsive spending behaviors reported impulsive decisionmaking reported reckless driving associated with mental health symptoms.  Patient reports the following current concerns for their personal safety: None.  Patient reports there are not    firearms in the house.        History of Safety Concerns:  Patient denied a history of homicidal ideation.     Patient denied a history of personal safety concerns.    Patient denied a history of assaultive behaviors.    Patient denied a history of sexual assault behaviors.     Patient reported a history unsafe motor vehicle operation reported a history of unsafe sex practices  reported a history of placing themselves in unsafe environment(s) associated with substance use.  Patient reported a history of reckless driving associated with mental health symptoms.  Patient reports the following protective factors:      Risk Plan:  See Recommendations for Safety and Risk Management Plan    Review of Symptoms per patient report:  Depression: Change in sleep, Lack of interest, Change in energy level, Difficulties concentrating, Change in appetite, Psychomotor slowing or agitation and Feeling sad, down, or depressed  Ofe:  Distractibility  Psychosis: No Symptoms  Anxiety: Excessive worry, Nervousness, Social anxiety, Sleep disturbance, Psychomotor agitation, Ruminations, Poor concentration and Irritability  Panic:  No symptoms  Post Traumatic Stress Disorder:  No Symptoms   Eating Disorder: No Symptoms  ADD / ADHD:  Inattentive, Difficulties listening, Distractibility, Restlessness/fidgety and Hyperverbal  Conduct Disorder: No symptoms  Autism Spectrum Disorder: No symptoms  Obsessive  Compulsive Disorder: No Symptoms    Patient reports the following compulsive behaviors and treatment history:picking skin around finger nails.     Diagnostic Criteria:     Generalized Anxiety Disorder  A. Excessive anxiety and worry about a number of events or activities (such as work or school performance).   B. The person finds it difficult to control the worry.  C. Select 3 or more symptoms (required for diagnosis). Only one item is required in children.   - Being easily fatigued.    - Difficulty concentrating or mind going blank.    - Irritability.   D. The focus of the anxiety and worry is not confined to features of an Axis I disorder.  E. The anxiety, worry, or physical symptoms cause clinically significant distress or impairment in social, occupational, or other important areas of functioning.   F. The disturbance is not due to the direct physiological effects of a substance (e.g., a drug of abuse, a medication) or a general medical condition (e.g., hyperthyroidism) and does not occur exclusively during a Mood Disorder, a Psychotic Disorder, or a Pervasive Developmental Disorder.    - The aforementioned symptoms began 18 year(s) ago and occurs 7 days per week and is experienced as mild.  Major Depressive Disorder  CRITERIA (A-C) REPRESENT A MAJOR DEPRESSIVE EPISODE - SELECT THESE CRITERIA  A) Recurrent episode(s) - symptoms have been present during the same 2-week period and represent a change from previous functioning 5 or more symptoms (required for diagnosis)   - Diminished interest or pleasure in all, or almost all, activities.    - Decreased sleep.    - Psychomotor activity agitation.    - Fatigue or loss of energy.    - Diminished ability to think or concentrate, or indecisiveness.   B) The symptoms cause clinically significant distress or impairment in social, occupational, or other important areas of functioning  C) The episode is not attributable to the physiological effects of a substance or to  another medical condition  D) The occurrence of major depressive episode is not better explained by other thought / psychotic disorders  E) There has never been a manic episode or hypomanic episode    Functional Status:  Patient reports the following functional impairments:  academic performance, health maintenance, operation of a motor vehicle, social interactions and work / vocational responsibilities.     Nonprogrammatic care:  Patient is requesting basic services to address current mental health concerns.    Clinical Summary:  1. Reason for assessment: ADHD Assessment  .  2. Psychosocial, Cultural and Contextual Factors: Nothing applicable to the ADHD Assessment  .  3. Principal DSM5 Diagnoses  (Sustained by DSM5 Criteria Listed Above): 296.35 (F33.41)  Major Depressive Disorder, Recurrent Episode, In partial remission With anxious distress  300.02 (F41.1) Generalized Anxiety Disorder  4. Other Diagnoses that is relevant to services: RO ADHD  5. Provisional Diagnosis: NA  6. Prognosis: Expect Improvement if symptoms are treated.  7. Likely consequences of symptoms if not treated: Symptoms likely to persist and may worsen if not treated.  8. Client strengths include:  educated, employed, has a previous history of therapy, insightful, intelligent and motivated .     Recommendations:     1. Plan for Safety and Risk Management:   Recommended that patient call 911 or go to the local ED should there be a change in any of these risk factors..          Report to child / adult protection services was NA.     2. Patient's identified nothing applicable to the ADHD Assessment.     3. Initial Treatment will focus on:    ADHD Testing:  Patient was given self and collaborative rating scales to be completed prior to the next appointment.  Depression and anxiety rating scales were completed.  Copies of previous report cards were requested. .     4. Resources/Service Plan:    services are not indicated.   Modifications  to assist communication are not indicated.   Additional disability accommodations are not indicated.      5. Collaboration:   Collaboration / coordination of treatment will be initiated with the following  support professionals: primary care physician.      6.  Referrals:   The following referral(s) will be initiated:NA     A Release of Information has been obtained for the following: partner and mother.    7. BREANNA:    BREANNA:  Discussed the general effects of drugs and alcohol on health and well-being. Provider gave patient printed information about the effects of chemical use on their health and well being. Recommendations:  Decrease caffeine/soda intake .     8. Records:   These were reviewed at time of assessment.   Information in this assessment was obtained from the medical record and  provided by patient who is a good historian.    Patient will have open access to their mental health medical record.        Provider Name/ Credentials:  Reanna Batista PsyD LP   6/17/2022

## 2022-06-07 NOTE — PROGRESS NOTES
[facility]   Mental Health & Addiction Services     Progress Note - Initial Visit    Patient  Name:  Ronda Segura Date: 2022          Service Type: Individual     Visit Start Time: 8:13AM  Visit End Time: 8:55AM    Visit #: 1     Attendees: Client attended alone    Service Modality:  Video Visit:      Provider verified identity through the following two step process.  Patient provided:  Patient     Telemedicine Visit: The patient's condition can be safely assessed and treated via synchronous audio and visual telemedicine encounter.      Reason for Telemedicine Visit: Services only offered telehealth    Originating Site (Patient Location): Patient's home    Distant Site (Provider Location): Provider Remote Setting- Home Office    Consent:  The patient/guardian has verbally consented to: the potential risks and benefits of telemedicine (video visit) versus in person care; bill my insurance or make self-payment for services provided; and responsibility for payment of non-covered services.     Patient would like the video invitation sent by:  My Chart    Mode of Communication:  Video Conference via [employer]    As the provider I attest to compliance with applicable laws and regulations related to telemedicine.       DATA:   Interactive Complexity: No   Crisis: No     Presenting Concerns/  Current Stressors:   Home Chore Related Stress    Patient reported that she has experienced depression and anxiety since age 14. Patient reported that     Patient reported that her partner is a medical resident and they moved to [place] from [place] for her partner's residency last year. 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 Patient reported that her sister lives 4 blocks away from the patient.     Patient reported the following ADHD symptoms: easily distracted, disorganized, difficulty with motivation to begin tasks, difficulty completing  "tasks, hyperactive, and interrupts.      Reviewed [facility] Attendance Agreement. Patient expressed understanding that if patient no shows or cancels with less than 24 hours for an appointment, twice within 6 months, then the patient will not be allowed to schedule for six months.     Encouraged patient to contact their insurance provider to learn more about their personal cost for the assessment process.         ASSESSMENT:  Mental Status Assessment:  Appearance:   Appropriate   Eye Contact:   Good   Psychomotor Behavior: Hyperactive   Attitude:   Cooperative  Interested  Orientation:   All  Speech   Rate / Production: Normal/ Responsive   Volume:  Normal   Mood:    Normal  Affect:    Appropriate   Thought Content:  Clear   Thought Form:  Coherent  Logical   Insight:    Good   Attention:    Asked for questions to be repeated     Safety Issues and Plan for Safety and Risk Management:     [place] Suicide Severity Rating Scale (Lifetime/Recent)  [place] Suicide Severity Rating (Lifetime/Recent) 6/7/2022   1. Wish to be Dead (Lifetime) 1   Wish to be Dead Description (Lifetime) SI started at age 23 when she \"got sober.\" Patient reported that the last SI was within the last year.   1. Wish to be Dead (Past 1 Month) 0   2. Non-Specific Active Suicidal Thoughts (Lifetime) 0   Most Severe Ideation Rating (Lifetime) 4   Description of Most Severe Ideation (Lifetime) I felt debilitated by my emotions. I was unable to handle what was going on.   Frequency (Lifetime) 1   Duration (Lifetime) 1   Controllability (Lifetime) 1   Deterrents (Lifetime) 1   Reasons for Ideation (Lifetime) 5   Actual Attempt (Lifetime) 0   Has subject engaged in non-suicidal self-injurious behavior? (Lifetime) 0   Interrupted Attempts (Lifetime) 0   Aborted or Self-Interrupted Attempt (Lifetime) 0   Preparatory Acts or Behavior (Lifetime) 0   Calculated C-SSRS Risk Score (Lifetime/Recent) No Risk Indicated     Patient " denies current fears or concerns for personal safety.  Patient denies current or recent suicidal ideation or behaviors.  Patient denies current or recent homicidal ideation or behaviors.  Patient denies current or recent self injurious behavior or ideation.  Patient denies other safety concerns.  Recommended that patient call 911 or go to the local ED should there be a change in any of these risk factors.  Patient reports there are no firearms in the house.     Diagnostic Criteria:  Generalized Anxiety Disorder  A. Excessive anxiety and worry about a number of events or activities (such as work or school performance).   B. The person finds it difficult to control the worry.  C. Select 3 or more symptoms (required for diagnosis). Only one item is required in children.   - Being easily fatigued.    - Difficulty concentrating or mind going blank.    - Irritability.   D. The focus of the anxiety and worry is not confined to features of an Axis I disorder.  E. The anxiety, worry, or physical symptoms cause clinically significant distress or impairment in social, occupational, or other important areas of functioning.   F. The disturbance is not due to the direct physiological effects of a substance (e.g., a drug of abuse, a medication) or a general medical condition (e.g., hyperthyroidism) and does not occur exclusively during a Mood Disorder, a Psychotic Disorder, or a Pervasive Developmental Disorder.    - The aformentioned symptoms began 18 year(s) ago and occurs 7 days per week and is experienced as mild.  Major Depressive Disorder  CRITERIA (A-C) REPRESENT A MAJOR DEPRESSIVE EPISODE - SELECT THESE CRITERIA  A) Recurrent episode(s) - symptoms have been present during the same 2-week period and represent a change from previous functioning 5 or more symptoms (required for diagnosis)   - Diminished interest or pleasure in all, or almost all, activities.    - Decreased sleep.    - Psychomotor activity agitation.    -  Fatigue or loss of energy.    - Diminished ability to think or concentrate, or indecisiveness.   B) The symptoms cause clinically significant distress or impairment in social, occupational, or other important areas of functioning  C) The episode is not attributable to the physiological effects of a substance or to another medical condition  D) The occurence of major depressive episode is not better explained by other thought / psychotic disorders  E) There has never been a manic episode or hypomanic episode      DSM5 Diagnoses: (Sustained by DSM5 Criteria Listed Above)  Diagnoses: 296.35 (F33.41)  Major Depressive Disorder, Recurrent Episode, In partial remission With anxious distress  300.02 (F41.1) Generalized Anxiety Disorder  Psychosocial & Contextual Factors: Home Chore Related Stress  WHODAS 2.0 (12 item):   WHODAS 2.0 Total Score 6/7/2022   Total Score 24     Interventions:  CBT: socratic questioning, normalizing  MI: open ended questions    Collateral Reports Completed:  Routed note to PCP       PLAN: (Homework, other):  1. Provider will continue Diagnostic Assessment.  Patient was given the following to do until next session:  Patient was informed that she will be emailed the self and collaborative rating scales to be completed. Depression and anxiety rating scales were completed.  Copies of previous report cards were requested. Patient provided consent to email the questionnaires to [email or web address].      2.  Suicide Risk and Safety Concerns were assessed for Ronda Segura.    Patient meets the following risk assessment and triage: When the [place] Suicide Severity Rating Scale has been completed, the patient identifies lifetime history of suicidal ideation and/or Suicidal Behavior that is greater than 10 years.      The recommendation is to provide the Brief Safety Plan: Minimal Risk; safety plan not needed.       Reanna Batista Psy.D, LP  June 7, 2022

## 2022-06-17 ENCOUNTER — VIRTUAL VISIT (OUTPATIENT)
Dept: PSYCHOLOGY | Facility: CLINIC | Age: 32
End: 2022-06-17
Payer: COMMERCIAL

## 2022-06-17 DIAGNOSIS — Z13.39 ADHD (ATTENTION DEFICIT HYPERACTIVITY DISORDER) EVALUATION: ICD-10-CM

## 2022-06-17 DIAGNOSIS — F41.1 GENERALIZED ANXIETY DISORDER: Primary | ICD-10-CM

## 2022-06-17 DIAGNOSIS — F33.41 MAJOR DEPRESSIVE DISORDER, RECURRENT EPISODE, IN PARTIAL REMISSION WITH ANXIOUS DISTRESS (H): ICD-10-CM

## 2022-06-17 PROCEDURE — 90791 PSYCH DIAGNOSTIC EVALUATION: CPT | Mod: 95 | Performed by: PSYCHOLOGIST

## 2022-06-17 NOTE — PROGRESS NOTES
[facility]  Provider Name:  Reanna Lester      Credentials:  Shamika DOUGLASS    PATIENT'S NAME: Ronda Segura  PREFERRED NAME: Gail  PRONOUNS:      She/Her/Hers  MRN: [id number]  : 1990  ADDRESS: [address] [place], [place] [id number]  ACCT. NUMBER:  [id number]  DATE OF SERVICE: 2022   START TIME: 9:03AM  END TIME: 10:10AM  PREFERRED PHONE: [phone]  May we leave a program related message: Yes  SERVICE MODALITY:  Video Visit:      Provider verified identity through the following two step process.  Patient provided:  Patient     Telemedicine Visit: The patient's condition can be safely assessed and treated via synchronous audio and visual telemedicine encounter.      Reason for Telemedicine Visit: Services only offered telehealth    Originating Site (Patient Location): Patient's home    Distant Site (Provider Location): Provider Remote Setting- Home Office    Consent:  The patient/guardian has verbally consented to: the potential risks and benefits of telemedicine (video visit) versus in person care; bill my insurance or make self-payment for services provided; and responsibility for payment of non-covered services.     Patient would like the video invitation sent by:  My Chart    Mode of Communication:  Video Conference via [facility]well    As the provider I attest to compliance with applicable laws and regulations related to telemedicine.    UNIVERSAL ADULT Mental Health DIAGNOSTIC ASSESSMENT    Identifying Information:  Patient is a 32 year old,   .  The pronoun use throughout this assessment reflects the patient's chosen pronoun.  Patient was referred for an assessment by PCP Briana Barker MD.  Patient attended the session alone.    Chief Complaint:   The purpose of this evaluation is to: provide treatment recommendations and clarify diagnosis. Patient reported seeking services at this time for diagnostic assessment and recommendations for treatment.  Patient  "reported that      has completed a previous ADHD diagnostic assessment at the age of 24. Patient reported that she was diagnosed with ADD and was prescribed Vyvanse. Patient reported that the medication was helpful \"at times.\" Patient reported that the medication helped her start and complete tasks at home. Patient reported that she stopped taking the medication, because she did not want to be on medication.      Patient has received a previous diagnosis of ADD, depression, and anxiety. Patient reported that medication has been prescribed to address these problems.  Patient reported that medication was helpful and did not cause unpleasant side effects.     Effexor \"made me feel insane.\"     Current Medications:  FLUoxetine (PROZAC) 20 MG capsule  traZODone (DESYREL) 50 MG tablet    Patient denied allergies to medications.     Patient reported that she has experienced depression since age 14 and anxiety since age 17.     Patient reported that her partner is a medical resident and they moved to [place] from [place] for her partner's residency last year. 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 Patient reported that her sister lives 4 blocks away from the patient.     Patient reported the following ADHD symptoms: easily distracted, disorganized, difficulty with motivation to begin tasks, difficulty completing tasks, hyperactive, and interrupts. Patient reported that she first became aware of her inattention symptoms at the age of 23 after she became sober.       Social/Family History:  Patient reported they grew up in [place], [place]. They were raised by biological parents. Patient reported that her parents are still together.Patient reported that their childhood was \"great.\"  Patient described their current relationships with family of origin as close with parents. Patient reported that she is the youngest of three daughters. Patient reported that her sisters are 13 " "and 10 years older than she is, so she was raised somewhat like an only child. Patient reported that she is \"really close\" to her oldest sister and gets along with her middle sister, but is not as close with her.     Patient described her childhood family environment as nurturing and stable.  As a child, patient reported that she failed to complete assigned chores in the home environment, had problems with organization and keeping track of items and needed frequent reminders by parents to be motivated or to complete work. Patient reported no difficulty with childhood peer relationships.     The patient describes their cultural background as [facility]  .  Cultural influences and impact on patient's life structure, values, norms, and healthcare: patient works in healthcare  .  Contextual influences on patient's health include: Family Factors family was supportive of mental health care.    These factors will be addressed in the Preliminary Treatment plan.  Patient identified their preferred language to be English  . Patient reported they does not need the assistance of an  or other support involved in therapy.     Patient reported had no significant delays in developmental tasks.   Patient's highest education level was    . Patient identified the following learning problems: none reported.  Modifications will not be used to assist communication in therapy.   Patient reports they are able to understand written materials.    Patient did not receive tutoring services during the school years. Patient did not receive special education services. Patient  reported significant behavior and discipline problems including: frequent tardiness or absences. Patient did attend post secondary school.  Patient reported that she often felt bored, distractible behavior and problems learning new materials. Patient reported that in 11th grade, she started skipping approximately 2 classes each day, because she \"did " "not want to be there.\" Patient reported that she often skipped band and study toney classes.        ADHD Related History:   As a child, Patient reported having regular and consistent sleep patterns. Patient reported currently experiencing sleep disturbance, including: insomnia.  Client reported sleeping approximately 8 hours per night. Patient reported that if she does not take Trazodone, she sleeps for approximately 5-6 hours each night due to primary and middle insomnia. Patient  reported that she has not completed a sleep study.  Patient reported having cravings for sweets.  There are not significant nutritional concerns.  Patient reported engaging in regular exercise.      Patient graduated high school in 2010 with a 3.1 GPA.  During the elementary, middle, and high school years, patient recalls academic strengths in the area of math and science. Patient reported experiencing academic problems in reading and writing.     Patient reported that her challenge to college was difficulty. She reported that with the freedom, she started abusing alcohol and did not always make it to class or work. Patient reported that she failed to turn in assignments.     Patient reported that she started college in fall of 2010 when she attended [employer]. Patient reported that she called her mother in October of 2011 and said that she needed help. She reported that she was \"miserable, grades were bad, and I couldn't stop drinking.\" Patient reported that she then left the college in January of 2012.     Patient reported that in January of 2012, she moved back in with her parents and then started going to school at a technical college. Patient reported that she persistently took classes for approximately 6 years. Patient reported that her first few semesters she did not do well, when she was still drinking alcohol. Patient reported that she thinks that she was placed on academic probation and she was limited to only taking " "classes part time due to her previous grades. 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     Patient reported that after she became sober, she procrastinated with homework for her least preferred coursework.           Patient reported the following relationship history: 4 significant relationships.  Patient's current relationship status is partnered   for two years.   Patient identified their sexual orientation as lesbian/vega/homosexual  .  Patient reported having no   child(seema). Patient identified partner, sister, mother, and AA community   as part of their support system.  Patient identified the quality of these relationships as stable and meaningful.     Patient's current living/housing situation involves living with partner in a rented house . The immediate members of family and household include self and partner  and they report that housing is stable.    Patient is currently employed full time. Patient reports their finances are obtained through employment   . The patient's work history includes: nursing, restaurant industry,  in a hospital, campus gardening, grocery store cafe and meat department, [employer], CNA, and LPN. The longest period of employment has been 3 years.  Client has been terminated from a place of employment. Patient reported that she was terminated three times. Patient reported that she was terminated from two jobs in college when she was abusing alcohol and would \"not show up.\" Patient reported that at approximately age 23, she stopped showing up to a job due to interpersonal conflict with a former partner and her new partner who all worked together.     Patient reported that at approximately age 23, she was \"drunk\" at a restaurant job and she \"challenged\" the owner. Patient reported that the owner asked her to leave and she did not, so the police were called. Patient reported that the police took her home and she told the police officers that her parents abused her, " because she did not want to go home and did not want to get sober. Patient reported that her parents then said that she had to get sober, so she did. Patient reported that at age 23, she joined AA and became sober.              Patient does not   identify finances as a current stressor.      Patient reported that they have not  been involved with the legal system. Patient   being under probation/ parole/ jurisdiction. They are not under any current court jurisdiction.     Patient has received a 's license.  Patient has received moving violations, including: 3 speeding tickets.  Patient reported the following driving habits: experiencces road rage, fails to obey traffic signs and laws, gets lost easily and often exceeds the speed limit / speeds.  According to client, other people are comfortable riding as passengers when she is driving.       Patient's Strengths and Limitations:  Patient identified the following strengths or resources that will help them succeed in treatment: commitment to health and well being, friends / good social support, family support, insight, intelligence and work ethic. Things that may interfere with the patient's success in treatment include: scheduling conflicts.     Assessments:  The following assessments were completed by patient for this visit:  PHQ9:   PHQ-9 SCORE 11/5/2021 12/3/2021 6/7/2022   PHQ-9 Total Score 13 5 9     GAD7:   ROSALVA-7 SCORE 11/5/2021 12/3/2021 6/7/2022   Total Score 7 9 6     CAGE-AID:   CAGE-AID Total Score 6/7/2022   Total Score 0     PROMIS 10-Global Health (all questions and answers displayed):   PROMIS 10 6/17/2022   In general, would you say your health is: Good   In general, would you say your quality of life is: Good   In general, how would you rate your physical health? Fair   In general, how would you rate your mental health, including your mood and your ability to think? Fair   In general, how would you rate your satisfaction with your social  "activities and relationships? Poor   In general, please rate how well you carry out your usual social activities and roles Fair   To what extent are you able to carry out your everyday physical activities such as walking, climbing stairs, carrying groceries, or moving a chair? Completely   How often have you been bothered by emotional problems such as feeling anxious, depressed or irritable? Sometimes   How would you rate your fatigue on average? Moderate   How would you rate your pain on average?   0 = No Pain  to  10 = Worst Imaginable Pain 0   In general, would you say your health is: 3   In general, would you say your quality of life is: 3   In general, how would you rate your physical health? 2   In general, how would you rate your mental health, including your mood and your ability to think? 2   In general, how would you rate your satisfaction with your social activities and relationships? 1   In general, please rate how well you carry out your usual social activities and roles. (This includes activities at home, at work and in your community, and responsibilities as a parent, child, spouse, employee, friend, etc.) 2   To what extent are you able to carry out your everyday physical activities such as walking, climbing stairs, carrying groceries, or moving a chair? 5   In the past 7 days, how often have you been bothered by emotional problems such as feeling anxious, depressed, or irritable? 3   In the past 7 days, how would you rate your fatigue on average? 3   In the past 7 days, how would you rate your pain on average, where 0 means no pain, and 10 means worst imaginable pain? 0   Global Mental Health Score 9   Global Physical Health Score 15   PROMIS TOTAL - SUBSCORES 24     [place] Suicide Severity Rating Scale (Lifetime/Recent)  [place] Suicide Severity Rating (Lifetime/Recent) 6/7/2022   1. Wish to be Dead (Lifetime) 1   Wish to be Dead Description (Lifetime) SI started at age 23 when she \"got sober.\" " Patient reported that the last SI was within the last year.   1. Wish to be Dead (Past 1 Month) 0   2. Non-Specific Active Suicidal Thoughts (Lifetime) 0   Most Severe Ideation Rating (Lifetime) 4   Description of Most Severe Ideation (Lifetime) I felt debilitated by my emotions. I was unable to handle what was going on.   Frequency (Lifetime) 1   Duration (Lifetime) 1   Controllability (Lifetime) 1   Deterrents (Lifetime) 1   Reasons for Ideation (Lifetime) 5   Actual Attempt (Lifetime) 0   Has subject engaged in non-suicidal self-injurious behavior? (Lifetime) 0   Interrupted Attempts (Lifetime) 0   Aborted or Self-Interrupted Attempt (Lifetime) 0   Preparatory Acts or Behavior (Lifetime) 0   Calculated C-SSRS Risk Score (Lifetime/Recent) No Risk Indicated       Personal and Family Medical History:  Patient does   report a family history of mental health concerns. Patient reported that her mother's parents abused alcohol and cocaine. Patient reported that her oldest sister used to abuse alcohol. Patient reported that her oldest sister has depression and anxiety.  Patient reports family history includes Heart Disease in her maternal grandmother; Hyperlipidemia in her mother; Hypertension in her father and mother.     Patient does report Mental Health Diagnosis and/or Treatment.  Patient reported the following previous diagnoses which include(s): an Anxiety Disorder and Depression.  Patient reported symptoms began at 14 for depression, age 18 for anxiety, and age 23 for ADHD.  Patient has received mental health services in the past: therapy with Alexandra Ramey in [place], [place] and primary care provider at [facility]. Psychiatric Hospitalizations: None.  Patient denies a history of civil commitment.  Patient is receiving other mental health services.  These include primary care provider at [facility].  For follow-up on TBD.     Patient has had a physical exam to rule out medical causes for current symptoms.  Date of  last physical exam was within the past year. Client was encouraged to follow up with PCP if symptoms were to develop. The patient has a [facility] Care Provider, who is named Briana Barker..  Patient reports no current medical concerns and no current dental concerns.  Patient denies any issues with pain..   There are not significant appetite / nutritional concerns / weight changes. However, she reported that she would like to lose weight. Patient does not report a history of head injury / trauma / cognitive impairment.      Current Medications:  FLUoxetine (PROZAC) 20 MG capsule    traZODone (DESYREL) 50 MG tablet      Medication Adherence:  Patient reports taking prescribed medications as prescribed. However, Patient reported that at times she forgets to take the Prozac 2-3 days in a row, until she does not feel well and then re-starts the medication.     Patient Allergies:  No Known Allergies    Medical History:  No past medical history on file.      Current Mental Status Exam:   Appearance:  Appropriate    Eye Contact:  Good   Psychomotor:  Hyperactive       Gait / station:  no problem  Attitude / Demeanor: Cooperative  Interested  Speech      Rate / Production: Normal/ Responsive      Volume:  Normal  volume      Language:  intact  Mood:   Euthymic  Affect:   Appropriate    Thought Content: Clear   Thought Process: Tangential       Associations: No loosening of associations  Insight:   Good   Judgment:  Intact   Orientation:  All  Attention/concentration: Good      Substance Use:  Patient did report a family history of substance use concerns; see medical history section for details.  Patient has not received chemical dependency treatment in the past.  Patient has not ever been to detox.  Patient reported that she worked the [employer]  program ages [age].     Patient is not currently receiving any chemical dependency treatment.           Substance History of use Age of first use Date of last use     Pattern  "and duration of use (include amounts and frequency)   Alcohol  alcohol abuse    18  23 No current use due to history of alcohol abuse.   Cannabis    social use  18 April 2022 Patient reported that she typically uses cannabis in edible form once every 2-3 months.      Amphetamines    Never         Cocaine/crack     social use   20   22 Patient reported that she used in college in social setting and did one line at a time   Hallucinogens    Social use   20   22 Patient reported that she tried LSD and mushrooms in social settings.   Inhalants              Heroin              Other Opiates          Benzodiazepine            Barbiturates          Over the counter meds   Drank robotussin when did not have alcohol      Patient reported that she once drank Robitussin when she did not have access to alcohol.      Caffeine  current use  18  6/17/2022  Patient reported that on work days she typically drinks 2 cups of coffee and one soda. Patient reported that the days she is home, she typically drinks 1.5 cups of coffee, and 2-3 12 ounce sodas daily. Patient reported that she typically drinks one energy drink each month when she is working a long shift.      Nicotine    former  17  28    Other substances not listed above:  Identify:             Patient reported the following problems as a result of their substance use: no current use  .    Substance Use: No symptoms    Based on the negative CAGE score and clinical interview there  are not indications of drug or alcohol abuse.      Significant Losses / Trauma / Abuse / Neglect Issues:   Patient did not serve in the .  There are indications or report of significant loss, trauma, abuse or neglect issues related to: are no indications and client denies any losses, trauma, abuse, or neglect concerns.  However, Patient reported that she lost friends when she became sober at age 23. Patient reported that she \"did and said things that were very hurtful\" to high school " friends.      Concerns for possible neglect are not present.     Safety Assessment:   Patient denies current homicidal ideation and behaviors.  Patient denies current self-injurious ideation and behaviors.    Patient denied risk behaviors associated with substance use.  Patient reported impulsive/compulsive spending behaviors reported impulsive decisionmaking reported reckless driving associated with mental health symptoms.  Patient reports the following current concerns for their personal safety: None.  Patient reports there are not    firearms in the house.        History of Safety Concerns:  Patient denied a history of homicidal ideation.     Patient denied a history of personal safety concerns.    Patient denied a history of assaultive behaviors.    Patient denied a history of sexual assault behaviors.     Patient reported a history unsafe motor vehicle operation reported a history of unsafe sex practices  reported a history of placing themselves in unsafe environment(s) associated with substance use.  Patient reported a history of reckless driving associated with mental health symptoms.  Patient reports the following protective factors:      Risk Plan:  See Recommendations for Safety and Risk Management Plan    Review of Symptoms per patient report:  Depression: Change in sleep, Lack of interest, Change in energy level, Difficulties concentrating, Change in appetite, Psychomotor slowing or agitation and Feeling sad, down, or depressed  Ofe:  Distractibility  Psychosis: No Symptoms  Anxiety: Excessive worry, Nervousness, Social anxiety, Sleep disturbance, Psychomotor agitation, Ruminations, Poor concentration and Irritability  Panic:  No symptoms  Post Traumatic Stress Disorder:  No Symptoms   Eating Disorder: No Symptoms  ADD / ADHD:  Inattentive, Difficulties listening, Distractibility, Restlessness/fidgety and Hyperverbal  Conduct Disorder: No symptoms  Autism Spectrum Disorder: No symptoms  Obsessive  Compulsive Disorder: No Symptoms    Patient reports the following compulsive behaviors and treatment history:picking skin around finger nails.     Diagnostic Criteria:     Generalized Anxiety Disorder  A. Excessive anxiety and worry about a number of events or activities (such as work or school performance).   B. The person finds it difficult to control the worry.  C. Select 3 or more symptoms (required for diagnosis). Only one item is required in children.   - Being easily fatigued.    - Difficulty concentrating or mind going blank.    - Irritability.   D. The focus of the anxiety and worry is not confined to features of an Axis I disorder.  E. The anxiety, worry, or physical symptoms cause clinically significant distress or impairment in social, occupational, or other important areas of functioning.   F. The disturbance is not due to the direct physiological effects of a substance (e.g., a drug of abuse, a medication) or a general medical condition (e.g., hyperthyroidism) and does not occur exclusively during a Mood Disorder, a Psychotic Disorder, or a Pervasive Developmental Disorder.    - The aforementioned symptoms began 18 year(s) ago and occurs 7 days per week and is experienced as mild.  Major Depressive Disorder  CRITERIA (A-C) REPRESENT A MAJOR DEPRESSIVE EPISODE - SELECT THESE CRITERIA  A) Recurrent episode(s) - symptoms have been present during the same 2-week period and represent a change from previous functioning 5 or more symptoms (required for diagnosis)   - Diminished interest or pleasure in all, or almost all, activities.    - Decreased sleep.    - Psychomotor activity agitation.    - Fatigue or loss of energy.    - Diminished ability to think or concentrate, or indecisiveness.   B) The symptoms cause clinically significant distress or impairment in social, occupational, or other important areas of functioning  C) The episode is not attributable to the physiological effects of a substance or to  another medical condition  D) The occurrence of major depressive episode is not better explained by other thought / psychotic disorders  E) There has never been a manic episode or hypomanic episode    Functional Status:  Patient reports the following functional impairments:  academic performance, health maintenance, operation of a motor vehicle, social interactions and work / vocational responsibilities.     Nonprogrammatic care:  Patient is requesting basic services to address current mental health concerns.    Clinical Summary:  1. Reason for assessment: ADHD Assessment  .  2. Psychosocial, Cultural and Contextual Factors: Nothing applicable to the ADHD Assessment  .  3. Principal DSM5 Diagnoses  (Sustained by DSM5 Criteria Listed Above): 296.35 (F33.41)  Major Depressive Disorder, Recurrent Episode, In partial remission With anxious distress  300.02 (F41.1) Generalized Anxiety Disorder  4. Other Diagnoses that is relevant to services: RO ADHD  5. Provisional Diagnosis: NA  6. Prognosis: Expect Improvement if symptoms are treated.  7. Likely consequences of symptoms if not treated: Symptoms likely to persist and may worsen if not treated.  8. Client strengths include:  educated, employed, has a previous history of therapy, insightful, intelligent and motivated .     Recommendations:     1. Plan for Safety and Risk Management:   Recommended that patient call 911 or go to the local ED should there be a change in any of these risk factors..          Report to child / adult protection services was NA.     2. Patient's identified nothing applicable to the ADHD Assessment.     3. Initial Treatment will focus on:    ADHD Testing:  Patient was given self and collaborative rating scales to be completed prior to the next appointment.  Depression and anxiety rating scales were completed.  Copies of previous report cards were requested. .     4. Resources/Service Plan:    services are not indicated.   Modifications  to assist communication are not indicated.   Additional disability accommodations are not indicated.      5. Collaboration:   Collaboration / coordination of treatment will be initiated with the following  support professionals: primary care physician.      6.  Referrals:   The following referral(s) will be initiated:NA     A Release of Information has been obtained for the following: partner and mother.    7. BREANNA:    BREANNA:  Discussed the general effects of drugs and alcohol on health and well-being. Provider gave patient printed information about the effects of chemical use on their health and well being. Recommendations:  Decrease caffeine/soda intake .     8. Records:   These were reviewed at time of assessment.   Information in this assessment was obtained from the medical record and  provided by patient who is a good historian.    Patient will have open access to their mental health medical record.        Provider Name/ Credentials:  Reanna Batista PsyD LP   6/17/2022

## 2022-06-20 ENCOUNTER — DOCUMENTATION ONLY (OUTPATIENT)
Dept: PSYCHOLOGY | Facility: CLINIC | Age: 32
End: 2022-06-20
Payer: COMMERCIAL

## 2022-06-20 NOTE — CONFIDENTIAL NOTE
"[facility]  ADHD Evaluation         Patient: Ronda Segura   YOB: 1990  MRN: [id number]  Date(s) of assessment:  Barry self-report and collateral measures scored and interpreted 6-20-22        Assessment tools:      Barry Adult ADHD Rating Scale-IV: Self and Other Reports (BAARS-IV), Barry Functional Impairment Scale: Self and Other Reports (BFIS) and Barry Deficits in Executive Functioning Scale: Self and Other Reports (BDEFS)        Assessment Results:        Barry Adult ADHD Rating Scale-IV: Self and Other Reports (BAARS-IV)  The BAARS-IV assesses for symptoms of ADHD that are experienced in one's daily life. This assessment measure includes self and collateral rating scales designed to provide information regarding current and childhood symptoms of ADHD including inattention, hyperactivity, and impulsivity. Self-report scores are reported as percentiles. Scores at the 76th-83rd percentile are considered marginal, scores at the 84th-92nd percentile are considered borderline, scores at the 93rd-95th percentile are considered mild, scores at the 96th-98th percentile are considered moderate, and those at the 99th percentile are considered severe. Collateral or \"other\" rating scales are reported as number of symptoms observed in comparison to those reported by the client. Norms and percentile scores are not available for collateral reports.     Current Symptoms Scale--Self Report:   Client completed the self-report inventory of current symptoms. The results indicate that the client's Total ADHD Score was 48 which places her in the 97th percentile for overall ADHD symptoms. In addition, the client endorsed 5/9 (96th percentile) Inattention symptoms, 5/9 (97th percentile) Hyperactivity-Impulsivity symptoms, and 5/9 (94th percentile) Sluggish Cognitive Tempo symptoms. Client indicated that the reported symptoms have resulted in impaired functioning in home, work and " social relationships. Overall, the results suggest the client is experiencing Moderate ADHD symptoms.     Current Symptoms Scale--Other Report:  Client's partner completed the collateral report inventory of current symptoms. Based on the collateral contact's observation of symptoms, the client demonstrates 3/9 Inattention symptoms, 0/5 Hyperactivity, 0/4 Impulsivity symptoms, and 4/9 Sluggish Cognitive Tempo symptoms. The client's Total ADHD Score was 30. The collateral contact indicated the client demonstrates impaired functioning in home and social relationships. The collateral- and self-report scores are significantly different for Hyperactivity, Impulsivity, and Total ADHD.     Childhood Symptoms Scale--Self-Report:  Client completed the self-report inventory of childhood symptoms. The results indicate that the client's Total ADHD Score was 33 which places her in the 51st-75th percentile for overall ADHD symptoms in childhood. In addition, the client endorsed 2/9 (83rd percentile) Inattention symptoms and  3/9 (87th percentile) Hyperactivity-Impulsivity symptoms. Client indicated that the reported symptoms resulted in impaired functioning in school and social relationships Overall, the results suggest the client experienced  Marginal symptoms of ADHD as a child.     Childhood Symptoms Scale--Other Report:  Client's mother completed the collateral report inventory of childhood symptoms. Based on the collateral contact's recollection of client's childhood symptoms, the client demonstrated 0/9 Inattention symptoms and 0/9 Hyperactivity-Impulsivity symptoms. The client's Total ADHD Score was 20. The collateral contact indicated the client demonstrates impaired functioning in no areas. The collateral- and self-report scores are not significantly different.                           Barry Functional Impairment Scale: Self and Other Reports (BFIS)  The BFIS is used to assess an individuals' psychosocial impairment in  "major life/daily activities that may be due to a mental health disorder. This assessment measure includes self and collateral rating scales. Self-report scores are reported as percentiles. Scores at the 76th-83rd percentile are considered marginal, scores at the 84th-92nd percentile are considered borderline, scores at the 93rd-95th percentile are considered mild, scores at the 96th-98th percentile are considered moderate, and those at the 99th percentile are considered severe.Collateral or \"other\" rating scales are reported as number of symptoms observed in comparison to those reported by the client. Norms and percentile scores are not available for collateral reports.     Results indicate the client identified impairment (scores at or greater than 93rd percentile) in the following areas: work, social-strangers, social-friends, sexual relations and health maintenance The client's Mean Impairment Score was 5.61 (93rd percentile) indicating the client is reporting Mild impairment in functioning across domains. Client's partner completed the collateral rating scale, which indicated discrepant results. The collateral contact's scores were generally lower than the client's report.     Barry Deficits in Executive Functioning Scale (BDEFS)  The BDEFS is a measure used for evaluating dimensions of adult executive functioning in daily life.This assessment measure includes self and collateral rating scales. Self-report scores are reported as percentiles. Scores at the 76th-83rd percentile are considered marginal, scores at the 84th-92nd percentile are considered borderline, scores at the 93rd-95th percentile are considered mild, scores at the 96th-98th percentile are considered moderate, and those at the 99th percentile are considered severe.Collateral or \"other\" rating scales are reported as number of symptoms observed in comparison to those reported by the client. Norms and percentile scores are not available for " collateral reports.     Results indicate the client's Total Executive Functioning Score was 205  (93rd percentile). The ADHD-Executive Functioning Index score was 25 (92nd percentile). These scores suggest the client has Mild deficits in executive functioning. These deficits may be due to ADHD. Results indicate the client identified significant deficits in the following areas: self-management to time 99th  and self-motivation 94th. Client's partner completed the collateral rating scale, which indicated discrepant results. The collateral contact's scores were generally lower than the client's report.    Next Step: Ask Patient to complete the MMPI.       Reanna Batista Psy.D, LP    6/20/2022

## 2022-09-12 DIAGNOSIS — F41.1 GENERALIZED ANXIETY DISORDER: ICD-10-CM

## 2022-09-12 DIAGNOSIS — F33.1 MODERATE EPISODE OF RECURRENT MAJOR DEPRESSIVE DISORDER (H): ICD-10-CM

## 2022-09-12 NOTE — TELEPHONE ENCOUNTER
[facility] phone call message- patient requesting a refill:    Full Medication Name: FLUoxetine (PROZAC) 20 MG capsule    Dose:Take 1 capsule (20 mg) by mouth daily     Pharmacy confirmed as   [id number] - [address] AT [address] [place] [address] & [address] [address]  Phone: [phone] Fax: [phone]  : Yes    Additional Comments: Pt called to request a refill sent in asap.     OK to leave a message on voice mail? Yes    Primary language: English      needed? No    Call taken on September 12, 2022 at 3:26 PM by Lizbeth Rhodes

## 2022-09-13 NOTE — TELEPHONE ENCOUNTER
[facility] phone call message- patient requesting to speak with PCP or provider:    PCP: Briana Barker    Additional Comments: Patient seeking refill    Is a call back needed? Yes    Patient informed that it may take up to 2 business days to hear back from PCP:Yes    OK to leave a message on voice mail? Yes    Primary language: English      needed? No    Call taken on September 13, 2022 at 3:20 PM by Quoc Dyson

## 2022-09-13 NOTE — TELEPHONE ENCOUNTER

## 2022-10-03 ENCOUNTER — FCC EXTENDED DOCUMENTATION (OUTPATIENT)
Dept: PSYCHOLOGY | Facility: CLINIC | Age: 32
End: 2022-10-03

## 2022-10-03 ENCOUNTER — HEALTH MAINTENANCE LETTER (OUTPATIENT)
Age: 32
End: 2022-10-03

## 2023-01-24 ENCOUNTER — DOCUMENTATION ONLY (OUTPATIENT)
Dept: PSYCHOLOGY | Facility: CLINIC | Age: 33
End: 2023-01-24
Payer: COMMERCIAL

## 2023-01-24 DIAGNOSIS — F41.1 GENERALIZED ANXIETY DISORDER: Primary | ICD-10-CM

## 2023-01-24 DIAGNOSIS — F33.41 MAJOR DEPRESSIVE DISORDER, RECURRENT EPISODE, IN PARTIAL REMISSION WITH ANXIOUS DISTRESS (H): ICD-10-CM

## 2023-01-24 PROCEDURE — 96130 PSYCL TST EVAL PHYS/QHP 1ST: CPT | Performed by: PSYCHOLOGIST

## 2023-01-24 PROCEDURE — 96131 PSYCL TST EVAL PHYS/QHP EA: CPT | Performed by: PSYCHOLOGIST

## 2023-01-24 NOTE — PROGRESS NOTES
[facility]  ADHD Evaluation         Patient: Ronda Segura   YOB: 1990  MRN: [id number]  Date(s) of assessment:  Diagnostic Assessment 6-17-22 and Barry self-report and collateral measures scored and interpreted 6-20-22    Information about appointment:  Client attended two  sessions to aid in determining client's mental health diagnosis or diagnoses and treatment recommendations that best address client concerns. Client records including medical were reviewed. A diagnostic assessment was conducted at the initial appointment. Client completed several rating scales to assist in assessing attention-related and other mental health symptoms that may be causing impairments in functioning. Rating scales were also completed by a collateral contact.    Assessment tools:      Barry Adult ADHD Rating Scale-IV: Self and Other Reports (BAARS-IV), Barry Functional Impairment Scale: Self and Other Reports (BFIS), Barry Deficits in Executive Functioning Scale: Self and Other Reports (BDEFS), Patient Health Questionnaire-9 (PHQ-9) and Generalized Anxiety Disorder-7 (ROSALVA-7)    ADHD Assessment tools have been completed remotely as in person observations were not possible.    Assessment Results:    Behavioral Observations:   The Patient appeared motivated to complete the assessment and was polite in interactions. She was oriented by three. She appeared to experience difficulty with attention and hyperactivity/impulsivity during sessions. The following results are likely to be an accurate reflection of Patient's current functioning. Patient did not respond to messages to continue with the ADHD Assessment process.        Barry Adult ADHD Rating Scale-IV: Self and Other Reports (BAARS-IV)  The BAARS-IV assesses for symptoms of ADHD that are experienced in one's daily life. This assessment measure includes self and collateral rating scales designed to provide information regarding current  "and childhood symptoms of ADHD including inattention, hyperactivity, and impulsivity. Self-report scores are reported as percentiles. Scores at the 76th-83rd percentile are considered marginal, scores at the 84th-92nd percentile are considered borderline, scores at the 93rd-95th percentile are considered mild, scores at the 96th-98th percentile are considered moderate, and those at the 99th percentile are considered severe. Collateral or \"other\" rating scales are reported as number of symptoms observed in comparison to those reported by the client. Norms and percentile scores are not available for collateral reports.      Current Symptoms Scale--Self Report:   Client completed the self-report inventory of current symptoms. The results indicate that the client's Total ADHD Score was 48 which places her in the 97th percentile for overall ADHD symptoms. In addition, the client endorsed 5/9 (96th percentile) Inattention symptoms, 5/9 (97th percentile) Hyperactivity-Impulsivity symptoms, and 5/9 (94th percentile) Sluggish Cognitive Tempo symptoms. Client indicated that the reported symptoms have resulted in impaired functioning in home, work and social relationships. Overall, the results suggest the client is experiencing Moderate ADHD symptoms.      Current Symptoms Scale--Other Report:  Client's partner completed the collateral report inventory of current symptoms. Based on the collateral contact's observation of symptoms, the client demonstrates 3/9 Inattention symptoms, 0/5 Hyperactivity, 0/4 Impulsivity symptoms, and 4/9 Sluggish Cognitive Tempo symptoms. The client's Total ADHD Score was 30. The collateral contact indicated the client demonstrates impaired functioning in home and social relationships. The collateral- and self-report scores are significantly different for Hyperactivity, Impulsivity, and Total ADHD.      Childhood Symptoms Scale--Self-Report:  Client completed the self-report inventory of childhood " "symptoms. The results indicate that the client's Total ADHD Score was 33 which places her in the 51st-75th percentile for overall ADHD symptoms in childhood. In addition, the client endorsed 2/9 (83rd percentile) Inattention symptoms and  3/9 (87th percentile) Hyperactivity-Impulsivity symptoms. Client indicated that the reported symptoms resulted in impaired functioning in school and social relationships Overall, the results suggest the client experienced  Marginal symptoms of ADHD as a child.      Childhood Symptoms Scale--Other Report:  Client's mother completed the collateral report inventory of childhood symptoms. Based on the collateral contact's recollection of client's childhood symptoms, the client demonstrated 0/9 Inattention symptoms and 0/9 Hyperactivity-Impulsivity symptoms. The client's Total ADHD Score was 20. The collateral contact indicated the client demonstrates impaired functioning in no areas. The collateral- and self-report scores are not significantly different.                           Barry Functional Impairment Scale: Self and Other Reports (BFIS)  The BFIS is used to assess an individuals' psychosocial impairment in major life/daily activities that may be due to a mental health disorder. This assessment measure includes self and collateral rating scales. Self-report scores are reported as percentiles. Scores at the 76th-83rd percentile are considered marginal, scores at the 84th-92nd percentile are considered borderline, scores at the 93rd-95th percentile are considered mild, scores at the 96th-98th percentile are considered moderate, and those at the 99th percentile are considered severe.Collateral or \"other\" rating scales are reported as number of symptoms observed in comparison to those reported by the client. Norms and percentile scores are not available for collateral reports.      Results indicate the client identified impairment (scores at or greater than 93rd percentile) in the " "following areas: work, social-strangers, social-friends, sexual relations and health maintenance. The client's Mean Impairment Score was 5.61 (93rd percentile) indicating the client is reporting Mild impairment in functioning across domains. Client's partner completed the collateral rating scale, which indicated discrepant results. The collateral contact's scores were generally lower than the client's report.     Barry Deficits in Executive Functioning Scale (BDEFS)  The BDEFS is a measure used for evaluating dimensions of adult executive functioning in daily life.This assessment measure includes self and collateral rating scales. Self-report scores are reported as percentiles. Scores at the 76th-83rd percentile are considered marginal, scores at the 84th-92nd percentile are considered borderline, scores at the 93rd-95th percentile are considered mild, scores at the 96th-98th percentile are considered moderate, and those at the 99th percentile are considered severe.Collateral or \"other\" rating scales are reported as number of symptoms observed in comparison to those reported by the client. Norms and percentile scores are not available for collateral reports.      Results indicate the client's Total Executive Functioning Score was 205  (93rd percentile). The ADHD-Executive Functioning Index score was 25 (92nd percentile). These scores suggest the client has Mild deficits in executive functioning. These deficits may be due to ADHD. Results indicate the client identified significant deficits in the following areas: self-management to time 99th  and self-motivation 94th. Client's partner completed the collateral rating scale, which indicated discrepant results. The collateral contact's scores were generally lower than the client's report.         Generalized Anxiety Disorder Questionnaire (ROSALVA-7)  This questionnaire is designed to assess for anxiety in adults.  Based on the score, she is experiencing mild symptoms of " "anxiety. Client identified the following symptoms of anxiety: feeling on edge/nervous/anxious, difficulty controlling worry, worrying about many different things, being restless and becoming easily annoyed or irritable    Patient Health Questionnaire- 9 (PHQ-9)   This questionnaire is designed to assess for depression in adults.  Based on the score, she is experiencing mild symptoms of depression. Client identified the following symptoms of depression: lack of interest, difficulty with sleep, poor appetite or overeating, poor concentration and restlessness or lethargy.         Summary (based on clinical interview, review of records, test results):      Identifying Information:  Patient is a 32 year old,   .  The pronoun use throughout this assessment reflects the patient's chosen pronoun.  Patient was referred for an assessment by PCP Briana Barker MD.  Patient attended the session alone.     Chief Complaint:   The purpose of this evaluation is to: provide treatment recommendations and clarify diagnosis. Patient reported seeking services at this time for diagnostic assessment and recommendations for treatment.  Patient reported that      has completed a previous ADHD diagnostic assessment at the age of 24. Patient reported that she was diagnosed with ADD and was prescribed Vyvanse. Patient reported that the medication was helpful \"at times.\" Patient reported that the medication helped her start and complete tasks at home. Patient reported that she stopped taking the medication, because she did not want to be on medication.       Patient has received a previous diagnosis of ADD, depression, and anxiety. Patient reported that medication has been prescribed to address these problems.  Patient reported that medication was helpful and did not cause unpleasant side effects.      Effexor \"made me feel insane.\"      Current Medications:  FLUoxetine (PROZAC) 20 MG capsule  traZODone (DESYREL) 50 MG " "tablet     Patient denied allergies to medications.      Patient reported that she has experienced depression since age 14 and anxiety since age 17.      Patient reported that her partner is a medical resident and they moved to [place] from [place] for her partner's residency last year. 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 Patient reported that her sister lives 4 blocks away from the patient.      Patient reported the following ADHD symptoms: easily distracted, disorganized, difficulty with motivation to begin tasks, difficulty completing tasks, hyperactive, and interrupts. Patient reported that she first became aware of her inattention symptoms at the age of 23 after she became sober.         Social/Family History:  Patient reported they grew up in [place], [place]. They were raised by biological parents. Patient reported that her parents are still together.Patient reported that their childhood was \"great.\"  Patient described their current relationships with family of origin as close with parents. Patient reported that she is the youngest of three daughters. Patient reported that her sisters are 13 and 10 years older than she is, so she was raised somewhat like an only child. Patient reported that she is \"really close\" to her oldest sister and gets along with her middle sister, but is not as close with her.      Patient described her childhood family environment as nurturing and stable.  As a child, patient reported that she failed to complete assigned chores in the home environment, had problems with organization and keeping track of items and needed frequent reminders by parents to be motivated or to complete work. Patient reported no difficulty with childhood peer relationships.      The patient describes their cultural background as [facility]  .  Cultural influences and impact on patient's life structure, values, norms, and healthcare: patient " "works in healthcare  .  Contextual influences on patient's health include: Family Factors family was supportive of mental health care.    These factors will be addressed in the Preliminary Treatment plan.  Patient identified their preferred language to be English  . Patient reported they does not need the assistance of an  or other support involved in therapy.      Patient reported had no significant delays in developmental tasks.   Patient's highest education level was    . Patient identified the following learning problems: none reported.  Modifications will not be used to assist communication in therapy.   Patient reports they are able to understand written materials.     Patient did not receive tutoring services during the school years. Patient did not receive special education services. Patient  reported significant behavior and discipline problems including: frequent tardiness or absences. Patient did attend post secondary school.  Patient reported that she often felt bored, distractible behavior and problems learning new materials. Patient reported that in 11th grade, she started skipping approximately 2 classes each day, because she \"did not want to be there.\" Patient reported that she often skipped band and study toney classes.          ADHD Related History:   As a child, Patient reported having regular and consistent sleep patterns. Patient reported currently experiencing sleep disturbance, including: insomnia.  Client reported sleeping approximately 8 hours per night. Patient reported that if she does not take Trazodone, she sleeps for approximately 5-6 hours each night due to primary and middle insomnia. Patient  reported that she has not completed a sleep study.  Patient reported having cravings for sweets.  There are not significant nutritional concerns.  Patient reported engaging in regular exercise.        Patient graduated high school in 2010 with a 3.1 GPA.  During the elementary, middle, " "and high school years, patient recalls academic strengths in the area of math and science. Patient reported experiencing academic problems in reading and writing.      Patient reported that her challenge to college was difficulty. She reported that with the freedom, she started abusing alcohol and did not always make it to class or work. Patient reported that she failed to turn in assignments.      Patient reported that she started college in fall of 2010 when she attended [employer]. Patient reported that she called her mother in October of 2011 and said that she needed help. She reported that she was \"miserable, grades were bad, and I couldn't stop drinking.\" Patient reported that she then left the college in January of 2012.      Patient reported that in January of 2012, she moved back in with her parents and then started going to school at a technical college. Patient reported that she persistently took classes for approximately 6 years. Patient reported that her first few semesters she did not do well, when she was still drinking alcohol. Patient reported that she thinks that she was placed on academic probation and she was limited to only taking classes part time due to her previous grades. 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      Patient reported that after she became sober, she procrastinated with homework for her least preferred coursework.               Patient reported the following relationship history: 4 significant relationships.  Patient's current relationship status is partnered   for two years.   Patient identified their sexual orientation as lesbian/vega/homosexual  .  Patient reported having no   child(seema). Patient identified partner, sister, mother, and AA community   as part of their support system.  Patient identified the quality of these relationships as stable and meaningful.      Patient's current living/housing situation involves living with partner in a rented house . The " "immediate members of family and household include self and partner  and they report that housing is stable.     Patient is currently employed full time. Patient reports their finances are obtained through employment   . The patient's work history includes: nursing, restaurant industry,  in a hospital, campus gardening, grocery store cafe and meat department, [employer], CNA, and LPN. The longest period of employment has been 3 years.  Client has been terminated from a place of employment. Patient reported that she was terminated three times. Patient reported that she was terminated from two jobs in college when she was abusing alcohol and would \"not show up.\" Patient reported that at approximately age 23, she stopped showing up to a job due to interpersonal conflict with a former partner and her new partner who all worked together.      Patient reported that at approximately age 23, she was \"drunk\" at a restaurant job and she \"challenged\" the owner. Patient reported that the owner asked her to leave and she did not, so the police were called. Patient reported that the police took her home and she told the police officers that her parents abused her, because she did not want to go home and did not want to get sober. Patient reported that her parents then said that she had to get sober, so she did. Patient reported that at age 23, she joined  and became sober.                   Patient does not   identify finances as a current stressor.       Patient reported that they have not  been involved with the legal system. Patient   being under probation/ parole/ jurisdiction. They are not under any current court jurisdiction.      Patient has received a 's license.  Patient has received moving violations, including: 3 speeding tickets.  Patient reported the following driving habits: experiences road rage, fails to obey traffic signs and laws, gets lost easily and often exceeds the speed limit / speeds.  " According to client, other people are comfortable riding as passengers when she is driving.         Patient's Strengths and Limitations:  Patient identified the following strengths or resources that will help them succeed in treatment: commitment to health and well being, friends / good social support, family support, insight, intelligence and work ethic. Things that may interfere with the patient's success in treatment include: scheduling conflicts.      Assessments:  The following assessments were completed by patient for this visit:  PHQ9:   PHQ-9 SCORE 11/5/2021 12/3/2021 6/7/2022   PHQ-9 Total Score 13 5 9      GAD7:   ROSALVA-7 SCORE 11/5/2021 12/3/2021 6/7/2022   Total Score 7 9 6      CAGE-AID:   CAGE-AID Total Score 6/7/2022   Total Score 0      PROMIS 10-Global Health (all questions and answers displayed):   PROMIS 10 6/17/2022   In general, would you say your health is: Good   In general, would you say your quality of life is: Good   In general, how would you rate your physical health? Fair   In general, how would you rate your mental health, including your mood and your ability to think? Fair   In general, how would you rate your satisfaction with your social activities and relationships? Poor   In general, please rate how well you carry out your usual social activities and roles Fair   To what extent are you able to carry out your everyday physical activities such as walking, climbing stairs, carrying groceries, or moving a chair? Completely   How often have you been bothered by emotional problems such as feeling anxious, depressed or irritable? Sometimes   How would you rate your fatigue on average? Moderate   How would you rate your pain on average?   0 = No Pain  to  10 = Worst Imaginable Pain 0   In general, would you say your health is: 3   In general, would you say your quality of life is: 3   In general, how would you rate your physical health? 2   In general, how would you rate your mental health,  "including your mood and your ability to think? 2   In general, how would you rate your satisfaction with your social activities and relationships? 1   In general, please rate how well you carry out your usual social activities and roles. (This includes activities at home, at work and in your community, and responsibilities as a parent, child, spouse, employee, friend, etc.) 2   To what extent are you able to carry out your everyday physical activities such as walking, climbing stairs, carrying groceries, or moving a chair? 5   In the past 7 days, how often have you been bothered by emotional problems such as feeling anxious, depressed, or irritable? 3   In the past 7 days, how would you rate your fatigue on average? 3   In the past 7 days, how would you rate your pain on average, where 0 means no pain, and 10 means worst imaginable pain? 0   Global Mental Health Score 9   Global Physical Health Score 15   PROMIS TOTAL - SUBSCORES 24      Eaton Suicide Severity Rating Scale (Lifetime/Recent)  Eaton Suicide Severity Rating (Lifetime/Recent) 6/7/2022   1. Wish to be Dead (Lifetime) 1   Wish to be Dead Description (Lifetime) SI started at age 23 when she \"got sober.\" Patient reported that the last SI was within the last year.   1. Wish to be Dead (Past 1 Month) 0   2. Non-Specific Active Suicidal Thoughts (Lifetime) 0   Most Severe Ideation Rating (Lifetime) 4   Description of Most Severe Ideation (Lifetime) I felt debilitated by my emotions. I was unable to handle what was going on.   Frequency (Lifetime) 1   Duration (Lifetime) 1   Controllability (Lifetime) 1   Deterrents (Lifetime) 1   Reasons for Ideation (Lifetime) 5   Actual Attempt (Lifetime) 0   Has subject engaged in non-suicidal self-injurious behavior? (Lifetime) 0   Interrupted Attempts (Lifetime) 0   Aborted or Self-Interrupted Attempt (Lifetime) 0   Preparatory Acts or Behavior (Lifetime) 0   Calculated C-SSRS Risk Score (Lifetime/Recent) No Risk " Indicated         Personal and Family Medical History:  Patient does   report a family history of mental health concerns. Patient reported that her mother's parents abused alcohol and cocaine. Patient reported that her oldest sister used to abuse alcohol. Patient reported that her oldest sister has depression and anxiety.  Patient reports family history includes Heart Disease in her maternal grandmother; Hyperlipidemia in her mother; Hypertension in her father and mother.      Patient does report Mental Health Diagnosis and/or Treatment.  Patient reported the following previous diagnoses which include(s): an Anxiety Disorder and Depression.  Patient reported symptoms began at 14 for depression, age 18 for anxiety, and age 23 for ADHD.  Patient has received mental health services in the past: therapy with Alexandra Ramey in [place], [place] and primary care provider at [facility]. Psychiatric Hospitalizations: None.  Patient denies a history of civil commitment.  Patient is receiving other mental health services.  These include primary care provider at [facility].  For follow-up on TBD.      Patient has had a physical exam to rule out medical causes for current symptoms.  Date of last physical exam was within the past year. Client was encouraged to follow up with PCP if symptoms were to develop. The patient has a [place] Primary Care Provider, who is named Briana Barker..  Patient reports no current medical concerns and no current dental concerns.  Patient denies any issues with pain..   There are not significant appetite / nutritional concerns / weight changes. However, she reported that she would like to lose weight. Patient does not report a history of head injury / trauma / cognitive impairment.       Current Medications:  FLUoxetine (PROZAC) 20 MG capsule     traZODone (DESYREL) 50 MG tablet        Medication Adherence:  Patient reports taking prescribed medications as prescribed. However, Patient reported that at  times she forgets to take the Prozac 2-3 days in a row, until she does not feel well and then re-starts the medication.      Patient Allergies:  No Known Allergies     Medical History:  No past medical history on file.        Current Mental Status Exam:   Appearance:                Appropriate    Eye Contact:               Good   Psychomotor:              Hyperactive       Gait / station:           no problem  Attitude / Demeanor:   Cooperative  Interested  Speech      Rate / Production:   Normal/ Responsive      Volume:                   Normal  volume      Language:               intact  Mood:                          Euthymic  Affect:                          Appropriate    Thought Content:        Clear   Thought Process:        Tangential       Associations:           No loosening of associations  Insight:                         Good   Judgment:                   Intact   Orientation:                 All  Attention/concentration:          Good        Substance Use:  Patient did report a family history of substance use concerns; see medical history section for details.  Patient has not received chemical dependency treatment in the past.  Patient has not ever been to detox.  Patient reported that she worked the [employer]  program ages [age].      Patient is not currently receiving any chemical dependency treatment.               Substance History of use Age of first use Date of last use       Pattern and duration of use (include amounts and frequency)   Alcohol  alcohol abuse     18  23 No current use due to history of alcohol abuse.   Cannabis     social use  18 April 2022 Patient reported that she typically uses cannabis in edible form once every 2-3 months.       Amphetamines     Never          Cocaine/crack      social use   20   22 Patient reported that she used in college in social setting and did one line at a time   Hallucinogens    Social use   20   22 Patient reported that she tried LSD and mushrooms in  "social settings.   Inhalants               Heroin               Other Opiates           Benzodiazepine              Barbiturates           Over the counter meds   Drank robotussin when did not have alcohol      Patient reported that she once drank Robitussin when she did not have access to alcohol.       Caffeine  current use  18  6/17/2022  Patient reported that on work days she typically drinks 2 cups of coffee and one soda. Patient reported that the days she is home, she typically drinks 1.5 cups of coffee, and 2-3 12 ounce sodas daily. Patient reported that she typically drinks one energy drink each month when she is working a long shift.       Nicotine    former  17  28     Other substances not listed above:  Identify:               Patient reported the following problems as a result of their substance use: no current use  .     Substance Use: No symptoms     Based on the negative CAGE score and clinical interview there  are not indications of drug or alcohol abuse.        Significant Losses / Trauma / Abuse / Neglect Issues:   Patient did not serve in the .  There are indications or report of significant loss, trauma, abuse or neglect issues related to: are no indications and client denies any losses, trauma, abuse, or neglect concerns.  However, Patient reported that she lost friends when she became sober at age 23. Patient reported that she \"did and said things that were very hurtful\" to high school friends.       Concerns for possible neglect are not present.      Safety Assessment:   Patient denies current homicidal ideation and behaviors.  Patient denies current self-injurious ideation and behaviors.    Patient denied risk behaviors associated with substance use.  Patient reported impulsive/compulsive spending behaviors reported impulsive decision making reported reckless driving associated with mental health symptoms.  Patient reports the following current concerns for their personal safety: " None.  Patient reports there are not    firearms in the house.         History of Safety Concerns:  Patient denied a history of homicidal ideation.     Patient denied a history of personal safety concerns.    Patient denied a history of assaultive behaviors.    Patient denied a history of sexual assault behaviors.     Patient reported a history unsafe motor vehicle operation reported a history of unsafe sex practices  reported a history of placing themselves in unsafe environment(s) associated with substance use.  Patient reported a history of reckless driving associated with mental health symptoms.  Patient reports the following protective factors:       Risk Plan:  See Recommendations for Safety and Risk Management Plan     Review of Symptoms per patient report:  Depression:     Change in sleep, Lack of interest, Change in energy level, Difficulties concentrating, Change in appetite, Psychomotor slowing or agitation and Feeling sad, down, or depressed  Ofe:             Distractibility  Psychosis:       No Symptoms  Anxiety:           Excessive worry, Nervousness, Social anxiety, Sleep disturbance, Psychomotor agitation, Ruminations, Poor concentration and Irritability  Panic:              No symptoms  Post Traumatic Stress Disorder:  No Symptoms   Eating Disorder:          No Symptoms  ADD / ADHD:              Inattentive, Difficulties listening, Distractibility, Restlessness/fidgety and Hyperverbal  Conduct Disorder:       No symptoms  Autism Spectrum Disorder:     No symptoms  Obsessive Compulsive Disorder:       No Symptoms     Patient reports the following compulsive behaviors and treatment history:picking skin around finger nails.      Diagnostic Criteria:      Generalized Anxiety Disorder  A. Excessive anxiety and worry about a number of events or activities (such as work or school performance).   B. The person finds it difficult to control the worry.  C. Select 3 or more symptoms (required for diagnosis).  Only one item is required in children.   - Being easily fatigued.    - Difficulty concentrating or mind going blank.    - Irritability.   D. The focus of the anxiety and worry is not confined to features of an Axis I disorder.  E. The anxiety, worry, or physical symptoms cause clinically significant distress or impairment in social, occupational, or other important areas of functioning.   F. The disturbance is not due to the direct physiological effects of a substance (e.g., a drug of abuse, a medication) or a general medical condition (e.g., hyperthyroidism) and does not occur exclusively during a Mood Disorder, a Psychotic Disorder, or a Pervasive Developmental Disorder.    - The aforementioned symptoms began 18 year(s) ago and occurs 7 days per week and is experienced as mild.  Major Depressive Disorder  CRITERIA (A-C) REPRESENT A MAJOR DEPRESSIVE EPISODE - SELECT THESE CRITERIA  A) Recurrent episode(s) - symptoms have been present during the same 2-week period and represent a change from previous functioning 5 or more symptoms (required for diagnosis)   - Diminished interest or pleasure in all, or almost all, activities.    - Decreased sleep.    - Psychomotor activity agitation.    - Fatigue or loss of energy.    - Diminished ability to think or concentrate, or indecisiveness.   B) The symptoms cause clinically significant distress or impairment in social, occupational, or other important areas of functioning  C) The episode is not attributable to the physiological effects of a substance or to another medical condition  D) The occurrence of major depressive episode is not better explained by other thought / psychotic disorders  E) There has never been a manic episode or hypomanic episode     Functional Status:  Patient reports the following functional impairments:  academic performance, health maintenance, operation of a motor vehicle, social interactions and work / vocational responsibilities.       In  conclusion, results of testing were significant for depression and anxiety. Rating scales suggested that the client is currently experiencing symptoms of an inattention and hyperactivity/impulsivity that were not present in childhood. The client endorsed moderate overall symptoms of inattention and hyperactivity-impulsivity.  The client and her collateral reported that the client was free of attention issues in childhood. The client reported scores of mild functional impairment. She reported that she significantly struggles with work, social-strangers, social-friends, sexual relations and health maintenance. She reported scores of mild executive functioning deficits. She reported that she significantly struggles with self-management to time and self-motivation.  The client and his collateral reporters reported discrepant scores on the rating scales. Additionally, the client is experiencing symptoms of anxiety and depression, which were reported on questionnaires.    Patient does not meet diagnostic criteria for ADHD at this time due to the absence of attention and hyperactivity-impulsivity symptoms in childhood.     Please note that the ADHD Assessment was not completed in its entirety due to patient not responding to requests to complete the MMPI.  This writer attempted to make contact with the patient to complete the MMPI on 6/10/22, 8/3/22, 10/3/22, and 12/13/22 .  Patient did not respond. Hence, the ADHD Assessment report is being written with the current information available at the time of the report date. This note will be routed to his PCP and sent to the billing department.      DSM5 Diagnoses: (Sustained by DSM5 Criteria Listed Above)  Diagnoses:    296.35 (F33.41)  Major Depressive Disorder, Recurrent Episode, In partial remission With anxious distress  300.02 (F41.1) Generalized Anxiety Disorder  Psychosocial & Contextual Factors: NA  WHODAS 2.0 (12 item) Raw Score:   WHODAS 2.0 Total Score 6/7/2022    Total Score 24     1. Schedule an appointment with your physician to discuss a medication evaluation.  2. Attend 12 step program. Continue with sobriety.   3. If substance abuse reoccurs, then a chemical dependency evaluation is recommended.  Referred to [facility]: [phone]. [place] [facility] is a harm reduction program: ([phone].  4. Consider attending a support group through [facility]. [email or web address]-groups/  5. Individual therapy is recommended for the treatment of depression and anxiety. Therapies focusing on identifying and challenging problematic thought processes can be beneficial.  6.  If patient is interested in completing the steps of the ADHD assessment, he is welcome to schedule with [facility] [phone].         Reanna Batista Psy.D, LP    1/24/2023       Psychological Testing   Billing/Services Summary       Testing Evaluation Services Base: 86135  (1st 60 mins) Add-on: 94882  (each addtl 60 mins)   Record Review and Clarify Referral Question   6-2-22 10:10-10:15AM 5 minutes   Integration/Report Generation   6-20-22 10:30-11:30AM Wilian (60)  1/24/2023 3:00-3:35PM Integrated Report (35) 95 minutes   Total Time: 100 minutes (1 hours, 40 minutes)   Total Units: 1 1           Diagnosis(es): (ICD-10) 296.35 (F33.41)  Major Depressive Disorder, Recurrent Episode, In partial remission With anxious distress  300.02 (F41.1) Generalized Anxiety Disorder

## 2023-02-11 ENCOUNTER — HEALTH MAINTENANCE LETTER (OUTPATIENT)
Age: 33
End: 2023-02-11

## 2023-06-01 DIAGNOSIS — G47.00 INSOMNIA, UNSPECIFIED TYPE: ICD-10-CM

## 2023-06-01 DIAGNOSIS — F41.1 GENERALIZED ANXIETY DISORDER: ICD-10-CM

## 2023-06-01 DIAGNOSIS — F33.1 MODERATE EPISODE OF RECURRENT MAJOR DEPRESSIVE DISORDER (H): ICD-10-CM

## 2023-06-01 RX ORDER — TRAZODONE HYDROCHLORIDE 50 MG/1
50 TABLET, FILM COATED ORAL AT BEDTIME
Qty: 30 TABLET | Refills: 1 | Status: SHIPPED | OUTPATIENT
Start: 2023-06-01 | End: 2023-06-22

## 2023-06-01 NOTE — TELEPHONE ENCOUNTER
[facility] phone call message- patient requesting a refill:    Full Medication Name: FLUoxetine (PROZAC) 20 MG capsule; traZODone (DESYREL) 50 MG tabl    Pharmacy confirmed as     [id number] - [address] AT [address] [place] [address] & [address] [address]  Phone: [phone] Fax: [phone]    : Yes    Additional Comments: The patient is requesting a refill.     OK to leave a message on voice mail? Yes    Primary language: English      needed? No    Call taken on June 1, 2023 at 11:32 AM by Jenna Marcos

## 2023-06-01 NOTE — TELEPHONE ENCOUNTER
"Patient has not been seen since 12/3/2021. Patient is scheduled on 6/14/2023 with Dr. Kay.  Cannot have been taking medication consistently looking at last prescribed.    Fluoxetine 9/14/22   Trazodone 11/5/2021    Sending to provider to review request for refill    Request for medication refill:  FLUoxetine (PROZAC) 20 MG capsule  traZODone (DESYREL) 50 MG tablet  Providers if patient needs an appointment and you are willing to give a one month supply please refill for one month and  send a letter/MyChart using \".SMILLIMITEDREFILL\" .smillimited and route chart to \"P SMI \" (Giving one month refill in non controlled medications is strongly recommended before denial)    If refill has been denied, meaning absolutely no refills without visit, please complete the smart phrase \".smirxrefuse\" and route it to the \"P SMI MED REFILLS\"  pool to inform the patient and the pharmacy.    Juju Mccord RN        "

## 2023-06-22 ENCOUNTER — OFFICE VISIT (OUTPATIENT)
Dept: FAMILY MEDICINE | Facility: CLINIC | Age: 33
End: 2023-06-22
Payer: COMMERCIAL

## 2023-06-22 VITALS
HEART RATE: 72 BPM | WEIGHT: 174.3 LBS | HEIGHT: 66 IN | DIASTOLIC BLOOD PRESSURE: 86 MMHG | SYSTOLIC BLOOD PRESSURE: 121 MMHG | TEMPERATURE: 98.9 F | OXYGEN SATURATION: 97 % | BODY MASS INDEX: 28.01 KG/M2

## 2023-06-22 DIAGNOSIS — Z11.4 SCREENING FOR HIV (HUMAN IMMUNODEFICIENCY VIRUS): ICD-10-CM

## 2023-06-22 DIAGNOSIS — F33.1 MODERATE EPISODE OF RECURRENT MAJOR DEPRESSIVE DISORDER (H): ICD-10-CM

## 2023-06-22 DIAGNOSIS — F41.1 GENERALIZED ANXIETY DISORDER: Primary | ICD-10-CM

## 2023-06-22 DIAGNOSIS — G47.00 INSOMNIA, UNSPECIFIED TYPE: ICD-10-CM

## 2023-06-22 DIAGNOSIS — Z23 ENCOUNTER FOR IMMUNIZATION: ICD-10-CM

## 2023-06-22 DIAGNOSIS — Z23 NEED FOR HPV VACCINATION: ICD-10-CM

## 2023-06-22 DIAGNOSIS — Z11.59 NEED FOR HEPATITIS C SCREENING TEST: ICD-10-CM

## 2023-06-22 LAB
HCV AB SERPL QL IA: NONREACTIVE
HIV 1+2 AB+HIV1 P24 AG SERPL QL IA: NONREACTIVE

## 2023-06-22 PROCEDURE — 99214 OFFICE O/P EST MOD 30 MIN: CPT | Mod: 25 | Performed by: STUDENT IN AN ORGANIZED HEALTH CARE EDUCATION/TRAINING PROGRAM

## 2023-06-22 PROCEDURE — 87389 HIV-1 AG W/HIV-1&-2 AB AG IA: CPT | Performed by: STUDENT IN AN ORGANIZED HEALTH CARE EDUCATION/TRAINING PROGRAM

## 2023-06-22 PROCEDURE — 90746 HEPB VACCINE 3 DOSE ADULT IM: CPT | Performed by: STUDENT IN AN ORGANIZED HEALTH CARE EDUCATION/TRAINING PROGRAM

## 2023-06-22 PROCEDURE — 90471 IMMUNIZATION ADMIN: CPT | Performed by: STUDENT IN AN ORGANIZED HEALTH CARE EDUCATION/TRAINING PROGRAM

## 2023-06-22 PROCEDURE — 36415 COLL VENOUS BLD VENIPUNCTURE: CPT | Performed by: STUDENT IN AN ORGANIZED HEALTH CARE EDUCATION/TRAINING PROGRAM

## 2023-06-22 PROCEDURE — 90651 9VHPV VACCINE 2/3 DOSE IM: CPT | Performed by: STUDENT IN AN ORGANIZED HEALTH CARE EDUCATION/TRAINING PROGRAM

## 2023-06-22 PROCEDURE — 90472 IMMUNIZATION ADMIN EACH ADD: CPT | Performed by: STUDENT IN AN ORGANIZED HEALTH CARE EDUCATION/TRAINING PROGRAM

## 2023-06-22 PROCEDURE — 86803 HEPATITIS C AB TEST: CPT | Performed by: STUDENT IN AN ORGANIZED HEALTH CARE EDUCATION/TRAINING PROGRAM

## 2023-06-22 RX ORDER — HYDROXYZINE HYDROCHLORIDE 10 MG/1
10-20 TABLET, FILM COATED ORAL 3 TIMES DAILY PRN
Qty: 60 TABLET | Refills: 3 | Status: SHIPPED | OUTPATIENT
Start: 2023-06-22

## 2023-06-22 RX ORDER — TRAZODONE HYDROCHLORIDE 50 MG/1
50 TABLET, FILM COATED ORAL AT BEDTIME
Qty: 90 TABLET | Refills: 3 | Status: SHIPPED | OUTPATIENT
Start: 2023-06-22

## 2023-06-22 ASSESSMENT — PATIENT HEALTH QUESTIONNAIRE - PHQ9: SUM OF ALL RESPONSES TO PHQ QUESTIONS 1-9: 9

## 2023-06-22 NOTE — PATIENT INSTRUCTIONS
Nice to meet you today Gail!    - continue taking fluoxetine - try taking in the AM, keep by your toothbrush  - try hydroxyzine prn - can take half pills or more than one if needed, see how it affects you   - continue trazodone  - someone will reach out about therapy - if you don't hear within 3 business days, give our clinic a call  - we will do a [employer] message check in in 1 month. Please reach out sooner if you have concerns  - for your hep B vaccine, here is the recommended (minimum) intervals for the 3 doses: Dose 1 to dose 2: 4 weeks / dose 2 to dose 3: 8 weeks / dose 1 to dose 3: 16 weeks  - for your HPV vaccine, need 1 more dose needed after today, at least 3 months later.

## 2023-06-22 NOTE — PROGRESS NOTES
Assessment & Plan     Generalized anxiety disorder  Moderate episode of recurrent major depressive disorder (H)  Anxiety has been worse, leading to vomiting and missing work every month or two.   Not consistent with prozac - takes every 2-3 days due to forgetting  Motivated to continue it - does think it is helpful when she takes consistently  No concern for eating disorder with the vomiting - this has to do with anxiety relief  Agreed to switch prozac to morning and place by toothbrush to help with consistency  Will also try PRN hydroxyzine  Interested in wellbutrin to mitigate sexual side effects of fluoxetine, but wants to wait a bit  Open to trying therapy again (see below) - [facility] to start. Referral placed  Will check in over [facility] in 4 weeks to see how's shes doing and consider starting Wellbutrin.  - FLUoxetine (PROZAC) 20 MG capsule; Take 1 capsule (20 mg) by mouth every morning Needs appt prior to next refill  - hydrOXYzine (ATARAX) 10 MG tablet; Take 1-2 tablets (10-20 mg) by mouth 3 times daily as needed for itching  - Adult Mental Health  Referral; Future    Insomnia, unspecified type  Trazodone has been helpful. Will stick with it for now.   - traZODone (DESYREL) 50 MG tablet; Take 1 tablet (50 mg) by mouth At Bedtime Needs appt prior to next refill    Screening for HIV (human immunodeficiency virus)  Accepts screening today. Monogamous with one female sexual partner for the last 5+ years.   - HIV Screening    Need for hepatitis C screening test  Accepts screening today. Monogamous with one female sexual partner for the last 5+ years.   - Hepatitis C Screen Reflex to HCV RNA Quant and Genotype    Encounter for immunization  Recommended intervals for recombivax hep B: dose 1 to dose 2: 4 weeks / dose 2 to dose 3: 8 weeks / dose 1 to dose 3: 16 weeks  - HEPATITIS B VACCINE,ADULT,IM    Need for HPV vaccination  Had one dose in adolescence. 1 more dose needed after today, at  "least 3 months later.   - HPV9 (Gardasil 9 )      Return in about 1 month (around 7/22/2023) for Follow up, with me, mental health check in .    Akosua Villafuerte MD  [facility] KAREN Bond is a 33 year old, presenting for the following health issues:  RECHECK (Pt is here to follow up on fluoxetine and trazodone medications.)        6/22/2023     8:34 AM   Additional Questions   Roomed by gerson harper   Accompanied by self         6/22/2023     8:34 AM   Patient Reported Additional Medications   Patient reports taking the following new medications No new meds reported     HPI     \"Gail\"     Needs refills    Anxiety and depression:   - anxiety has been really bad  - has been dealing with anxiety with vomiting - this has been going on over the last year, infrequent  - then calls into work  - does not seem related to food or body image  - does it to feel better  - sometimes will wake up, stomach in knots  - feels like a behavioral cycle that's hard to get out of   - no specific life stressors  - does note relationship with partner can be stressful - partner is a med resident, can be stressful   - last visit with Dr. Barker 12/3/21 - reviewed  - trazodone helps with sleep  - has to wake up really early for work    - been awhile since used therapy  - quit drinking etoh at age 23 after a rough few years -- reports bad experiences  - occasional mj use - recreational   - been better about taking meds this week  - will take fluoxetine like every 2-3 days, doesn't notice symptoms   - thinks fluoxetine is helpful, if she takes it every day    - no STI testing needd    - HPV shot ? - had one dose in 2009 - could get 2 more doses, 3 mo apart  - had td booster in 2016        Objective    /86   Pulse 72   Temp 98.9  F (37.2  C) (Oral)   Ht 1.68 m (5' 6.14\")   Wt 79.1 kg (174 lb 4.8 oz)   LMP 05/22/2023 (Approximate)   SpO2 97%   BMI 28.01 kg/m    Body mass index is 28.01 " kg/m .  Physical Exam   GENERAL: Healthy, alert and no distress  EYES: Eyes grossly normal to inspection.  No discharge or erythema, or obvious scleral/conjunctival abnormalities.  RESP: No audible wheeze, cough, or visible cyanosis.  No visible retractions or increased work of breathing.    SKIN: Visible skin clear. No significant rash, abnormal pigmentation or lesions.  NEURO: Cranial nerves grossly intact.  Mentation and speech appropriate for age.  PSYCH: Mentation appears normal, affect normal/bright, judgement and insight intact, normal speech and appearance well-groomed.    Results for orders placed or performed in visit on 06/22/23   HIV Screening     Status: Normal   Result Value Ref Range    HIV Antigen Antibody Combo Nonreactive Nonreactive   Hepatitis C Screen Reflex to HCV RNA Quant and Genotype     Status: Normal   Result Value Ref Range    Hepatitis C Antibody Nonreactive Nonreactive    Narrative    Assay performance characteristics have not been established for newborns, infants, and children.       ----- Service Performed and Documented by Resident or Fellow ------

## 2024-01-02 DIAGNOSIS — F41.1 GENERALIZED ANXIETY DISORDER: ICD-10-CM

## 2024-01-02 DIAGNOSIS — F33.1 MODERATE EPISODE OF RECURRENT MAJOR DEPRESSIVE DISORDER (H): ICD-10-CM

## 2024-01-02 NOTE — TELEPHONE ENCOUNTER
[facility] phone call message- patient requesting a refill:    Full Medication Name: FLUoxetine (PROZAC) 20 MG capsule     Dose: Take 1 capsule (20 mg) by mouth every morning Needs appt prior to next refill - Oral     Pharmacy confirmed as   [facility] - [place], [place] - [address]  [address] [address]  Phone: [phone] Fax: [phone] Alternate Fax: [phone], [phone]  : Yes    Additional Comments: Seeking bridge refill    OK to leave a message on voice mail? Yes    Primary language: English      needed? No    Call taken on January 2, 2024 at 1:20 PM by Quoc Dyson

## 2024-01-02 NOTE — TELEPHONE ENCOUNTER
[facility] phone call message- patient requesting to speak with PCP or provider:    PCP: Erica Nguyen    Additional Comments: Patient has been out of medication for 6 days.    Is a call back needed? Yes    Patient informed that it may take up to 2 business days to hear back from PCP:Yes    OK to leave a message on voice mail? Yes    Primary language: English      needed? No    Call taken on January 2, 2024 at 1:34 PM by Quoc Dyson

## 2024-01-02 NOTE — TELEPHONE ENCOUNTER
"Last seen 6/22/23  Attempted to call pt with no answer. Unable to leave message.    Request for medication refill:    FLUoxetine (PROZAC) 20 MG capsule     Providers if patient needs an appointment and you are willing to give a one month supply please refill for one month and  send a letter/MyChart using \".SMILLIMITEDREFILL\" .smillimited and route chart to \"P [facility] \" (Giving one month refill in non controlled medications is strongly recommended before denial)    If refill has been denied, meaning absolutely no refills without visit, please complete the smart phrase \".smirxrefuse\" and route it to the \"P SMI MED REFILLS\"  pool to inform the patient and the pharmacy.    Nila Mathis RN     "

## 2024-03-09 ENCOUNTER — HEALTH MAINTENANCE LETTER (OUTPATIENT)
Age: 34
End: 2024-03-09

## 2024-03-11 ENCOUNTER — OFFICE VISIT (OUTPATIENT)
Dept: FAMILY MEDICINE | Facility: CLINIC | Age: 34
End: 2024-03-11
Payer: COMMERCIAL

## 2024-03-11 VITALS
HEART RATE: 67 BPM | HEIGHT: 66 IN | SYSTOLIC BLOOD PRESSURE: 117 MMHG | OXYGEN SATURATION: 98 % | DIASTOLIC BLOOD PRESSURE: 81 MMHG | TEMPERATURE: 97.8 F | BODY MASS INDEX: 28.13 KG/M2 | RESPIRATION RATE: 16 BRPM

## 2024-03-11 DIAGNOSIS — A90 DENGUE: Primary | ICD-10-CM

## 2024-03-11 DIAGNOSIS — R11.0 NAUSEA: ICD-10-CM

## 2024-03-11 LAB
ACANTHOCYTES BLD QL SMEAR: NORMAL
ALBUMIN SERPL BCG-MCNC: 4.4 G/DL (ref 3.5–5.2)
ALP SERPL-CCNC: 61 U/L (ref 40–150)
ALT SERPL W P-5'-P-CCNC: 34 U/L (ref 0–50)
ANION GAP SERPL CALCULATED.3IONS-SCNC: 10 MMOL/L (ref 7–15)
AST SERPL W P-5'-P-CCNC: 32 U/L (ref 0–45)
AUER BODIES BLD QL SMEAR: NORMAL
BASO STIPL BLD QL SMEAR: NORMAL
BASOPHILS # BLD AUTO: 0 10E3/UL (ref 0–0.2)
BASOPHILS NFR BLD AUTO: 1 %
BILIRUB SERPL-MCNC: 0.3 MG/DL
BITE CELLS BLD QL SMEAR: NORMAL
BLISTER CELLS BLD QL SMEAR: NORMAL
BUN SERPL-MCNC: 9.4 MG/DL (ref 6–20)
BURR CELLS BLD QL SMEAR: NORMAL
CALCIUM SERPL-MCNC: 8.8 MG/DL (ref 8.6–10)
CHLORIDE SERPL-SCNC: 105 MMOL/L (ref 98–107)
CREAT SERPL-MCNC: 0.72 MG/DL (ref 0.51–0.95)
DACRYOCYTES BLD QL SMEAR: NORMAL
DEPRECATED HCO3 PLAS-SCNC: 25 MMOL/L (ref 22–29)
EGFRCR SERPLBLD CKD-EPI 2021: >90 ML/MIN/1.73M2
ELLIPTOCYTES BLD QL SMEAR: NORMAL
EOSINOPHIL # BLD AUTO: 0 10E3/UL (ref 0–0.7)
EOSINOPHIL NFR BLD AUTO: 1 %
ERYTHROCYTE [DISTWIDTH] IN BLOOD BY AUTOMATED COUNT: 15 % (ref 10–15)
FRAGMENTS BLD QL SMEAR: NORMAL
GLUCOSE SERPL-MCNC: 107 MG/DL (ref 70–99)
HCT VFR BLD AUTO: 41 % (ref 35–47)
HGB BLD-MCNC: 13.3 G/DL (ref 11.7–15.7)
HGB C CRYSTALS: NORMAL
HOWELL-JOLLY BOD BLD QL SMEAR: NORMAL
IMM GRANULOCYTES # BLD: 0 10E3/UL
IMM GRANULOCYTES NFR BLD: 0 %
LYMPHOCYTES # BLD AUTO: 1.5 10E3/UL (ref 0.8–5.3)
LYMPHOCYTES NFR BLD AUTO: 41 %
MCH RBC QN AUTO: 24.7 PG (ref 26.5–33)
MCHC RBC AUTO-ENTMCNC: 32.4 G/DL (ref 31.5–36.5)
MCV RBC AUTO: 76 FL (ref 78–100)
MONOCYTES # BLD AUTO: 0.3 10E3/UL (ref 0–1.3)
MONOCYTES NFR BLD AUTO: 7 %
NEUTROPHILS # BLD AUTO: 1.9 10E3/UL (ref 1.6–8.3)
NEUTROPHILS NFR BLD AUTO: 50 %
NEUTS HYPERSEG BLD QL SMEAR: NORMAL
NRBC # BLD AUTO: 0 10E3/UL
NRBC BLD AUTO-RTO: 0 /100
PLAT MORPH BLD: NORMAL
PLATELET # BLD AUTO: 191 10E3/UL (ref 150–450)
POLYCHROMASIA BLD QL SMEAR: NORMAL
POTASSIUM SERPL-SCNC: 4.3 MMOL/L (ref 3.4–5.3)
PROT SERPL-MCNC: 6.8 G/DL (ref 6.4–8.3)
RBC # BLD AUTO: 5.38 10E6/UL (ref 3.8–5.2)
RBC AGGLUT BLD QL: NORMAL
RBC MORPH BLD: NORMAL
ROULEAUX BLD QL SMEAR: NORMAL
SICKLE CELLS BLD QL SMEAR: NORMAL
SMUDGE CELLS BLD QL SMEAR: NORMAL
SODIUM SERPL-SCNC: 140 MMOL/L (ref 135–145)
SPHEROCYTES BLD QL SMEAR: NORMAL
STOMATOCYTES BLD QL SMEAR: NORMAL
TARGETS BLD QL SMEAR: NORMAL
TOXIC GRANULES BLD QL SMEAR: NORMAL
VARIANT LYMPHS BLD QL SMEAR: NORMAL
WBC # BLD AUTO: 3.8 10E3/UL (ref 4–11)

## 2024-03-11 PROCEDURE — 99213 OFFICE O/P EST LOW 20 MIN: CPT | Mod: GC

## 2024-03-11 PROCEDURE — 36415 COLL VENOUS BLD VENIPUNCTURE: CPT

## 2024-03-11 PROCEDURE — 85025 COMPLETE CBC W/AUTO DIFF WBC: CPT

## 2024-03-11 PROCEDURE — 80053 COMPREHEN METABOLIC PANEL: CPT

## 2024-03-11 RX ORDER — ONDANSETRON 4 MG/1
4 TABLET, ORALLY DISINTEGRATING ORAL EVERY 8 HOURS PRN
Qty: 30 TABLET | Refills: 0 | Status: SHIPPED | OUTPATIENT
Start: 2024-03-11

## 2024-03-11 NOTE — LETTER
March 11, 2024      Ronda YULIYA Colton  [address] [address]      Ronda should be excused from work from 3/4/24-3/10/24 due to an illness. 3/11/34-3/15/24 they should be allowed a lighter work load.        Sincerely,    Juana Chino MD

## 2024-03-11 NOTE — PATIENT INSTRUCTIONS
Thank you for coming to [facility] today.  Here is the plan from today's visit  Check for complications of dengue and other mosquito borne illnesses      Lab Testing:  If lab testing was done today, results will be communicated via: [employer]  If you had lab testing today and your results are reassuring or normal they will be mailed to you or sent through [employer] within 7 days.   Results that don't require quick action can take up to 7 days to be communicated.  If the lab tests need quick action we will call you with the results. The phone number we will call with results is # [phone] (home) . If this is not the best number please call our clinic and change the number.  Normal lab results are sent by mail by default if you are not signed up for [employer]  If you are having labs done on a different day, please call [phone] to schedule at [facility] or [phone] for other [facility] locations. Labs do not offer walk-in appointments.    Referrals  If any referrals were ordered today you should be getting a call in the next week.  If you don't get a call within a week please call one of the following numbers   For any referral within [facility]: call [phone]  If your referral is for mental health or substance use disorder treatment within [facility], you may also call [phone]  If your referral is to outside [facility], or if you have issues scheduling, please call the clinic number ([phone]) and ask for a care coordinator during business hours (8-5:00 M-F)  If your referral is for gender care (voice therapy, surgery), you may call the [facility] coordinator at [phone]    Medication Refills  If you need any refills please call your pharmacy and they will contact us.   If you need to  your refill at a new pharmacy, please contact the new pharmacy directly. The new pharmacy will help you get your medications transferred  faster.    Imaging  If a Mammogram was ordered for you at the [facility] call [phone] to schedule or change your appointment.  If you had an XRay/CT/Ultrasound/MRI ordered the number is [phone] to schedule or change your radiology appointment.   [facility] has limited ultrasound appointments available on Wednesdays, if you would like your ultrasound at [facility], please call [phone] to schedule.    Medication Refills:  If you need any refills please call your pharmacy and they will contact us.   If you need to  your refill at a new pharmacy, please contact the new pharmacy directly. The new pharmacy will help you get your medications transferred faster.     Follow up appointments  If you have any concerns about today's visit or wish to schedule another appointment please call our office during normal business hours [phone] (8-5:00 M-F)  If you can no longer make a scheduled visit at [facility], please cancel via [facility] or call us to cancel.     Medical Concerns:  If you have urgent medical concerns please call [phone] at any time of the day.  If you have a medical emergency please call [phone]    Juana Humphrey MD

## 2024-03-11 NOTE — PROGRESS NOTES
"  {PROVIDER CHARTING PREFERENCE:248914}    Braydon Bond is a 34 year old, presenting for the following health issues:  Clinic Care Coordination - Initial ([place], had a fever, body rash and eye pain.)      3/11/2024     9:34 AM   Additional Questions   Roomed by Doua   Accompanied by Self         3/11/2024     9:34 AM   Patient Reported Additional Medications   Patient reports taking the following new medications n/a         3/11/2024    Information    services provided? No     HPI     {MA/LPN/RN Pre-Provider Visit Orders- hCG/UA/Strep (Optional):294841}              Objective    /81 (BP Location: Left arm, Patient Position: Sitting, Cuff Size: Adult Large)   Pulse 67   Temp 97.8  F (36.6  C) (Oral)   Resp 16   Ht 1.676 m (5' 6\")   LMP 03/06/2024 (Approximate)   SpO2 98%   BMI 28.13 kg/m    Body mass index is 28.13 kg/m .  Physical Exam   {Exam List (Optional):377126}    {Diagnostic Test Results (Optional):467217}        Signed Electronically by: Juana Chino MD  {Email feedback regarding this note to [email or web address]   :[id number]}  "

## 2024-03-11 NOTE — PROGRESS NOTES
Assessment & Plan     Dengue fever, resolving  Nausea  Clinical features of dengue which have resolved (fever, retro-orbital pain, myalgias, rash, recent ravel). Considered acute HIV but no exposures, declined testing. Patient currently in the critical phase of dengue where thrombocytopenia would occur, will check CBC and CMP. Reassuring no s/s hemolysis. Recommended to go to ER if hemolysis symptoms were to occur. Recommended to follow up with Travel Medicine for any future trips.   - CBC with platelets differential; Future  - Comprehensive metabolic panel; Future  - ondansetron (ZOFRAN ODT) 4 MG ODT tab; Take 1 tablet (4 mg) by mouth every 8 hours as needed for nausea      Return if symptoms worsen or fail to improve.    Braydon Bond is a 34 year old, presenting for the following health issues:  Clinic Care Coordination - Initial (Pt traveled out of [facility], had a fever, body rash and eye pain.)        3/11/2024     9:34 AM   Additional Questions   Roomed by Doua   Accompanied by Self         3/11/2024     9:34 AM   Patient Reported Additional Medications   Patient reports taking the following new medications n/a         3/11/2024    Information    services provided? No     HPI     Fever  Joint Pains  -  Patient returned from [facility] on 3/1. During vacation had mosquito bites   - 3/4 patient developed fevers up to 103 along with joint and low back pain. No sore throat  - 3/5 patient started to develop pain at the back of their eyes. No conjunctivitis  -3/6 developed a diffuse pruritic rash sparing hands and face,   - 3/9 symptoms began to break with management w/ fluids and tylenol. Anorexia is persistent.   - Symptoms  have sense resolved. Concerned about long term follow up.  - Patient is up to date on vaccinations, not concerned about HIV exposure.   - Prior URI symptoms that have been treated w/ Augmentin before vacation. Patient had diarrhea 2/2 antibiotic      Review of  "Systems  Constitutional, HEENT, cardiovascular, pulmonary, gi and gu systems are negative, except as otherwise noted.      Objective    /81 (BP Location: Left arm, Patient Position: Sitting, Cuff Size: Adult Large)   Pulse 67   Temp 97.8  F (36.6  C) (Oral)   Resp 16   Ht 1.676 m (5' 6\")   LMP 03/06/2024 (Approximate)   SpO2 98%   BMI 28.13 kg/m    Body mass index is 28.13 kg/m .  Physical Exam   GENERAL: alert and no distress  EYES: Eyes grossly normal to inspection, PERRL and conjunctivae and sclerae normal  NECK: no adenopathy, no asymmetry, masses, or scars  RESP: lungs clear to auscultation - no rales, rhonchi or wheezes  CV: regular rate and rhythm, normal S1 S2, no S3 or S4, no murmur, click or rub, no peripheral edema  ABDOMEN: soft, nontender, no hepatosplenomegaly, no masses and bowel sounds normal  MS: no gross musculoskeletal defects noted, no edema  NEURO: Normal strength and tone, sensory exam grossly normal, mentation intact, and cranial nerves 2-12 intact          Jaxjodee Hall, MS3    Resident/Fellow Attestation   I, Juana Chino MD, was present with the medical/MARY KAY student who participated in the service and in the documentation of the note.  I have verified the history and personally performed the physical exam and medical decision making.  I agree with the assessment and plan of care as documented in the note.      Juana Chino MD  PGY2      Signed Electronically by: Juana Chino MD    "

## 2024-03-11 NOTE — Clinical Note
"    3/11/2024         RE: Ronda Segura  [address] [facility] [place] [address]        Dear Colleague,    Thank you for referring your patient, Ronda Segura, to the [facility]. Please see a copy of my visit note below.      {PROVIDER CHARTING PREFERENCE:005559}    Braydon Bond is a 34 year old, presenting for the following health issues:  Clinic Care Coordination - Initial ([place] of county, had a fever, body rash and eye pain.)      3/11/2024     9:34 AM   Additional Questions   Roomed by Doua   Accompanied by Self         3/11/2024     9:34 AM   Patient Reported Additional Medications   Patient reports taking the following new medications n/a         3/11/2024    Information    services provided? No     HPI     Patient had fevers up to 103, felt like they have been having pain behind their eyes, and joint pain. Patient went to [facility] for a out a week and returned 3/1. Symptoms started on Monday, around \Wednesday they developed a rash. Rash was diffuse and pruritic sparing palms and soles. At the moment feels a million times better.   - Anorexia  - Rash  - Felt like they made it through Saturday the 9th. Symptoms were managed by tylenol and fluids .     {MA/LPN/RN Pre-Provider Visit Orders- hCG/UA/Strep (Optional):132642}  {SUPERLIST (Optional):955141}  {additonal problems for provider to add (Optional):942065}    {ROS Picklists (Optional):648295}      Objective   /81 (BP Location: Left arm, Patient Position: Sitting, Cuff Size: Adult Large)   Pulse 67   Temp 97.8  F (36.6  C) (Oral)   Resp 16   Ht 1.676 m (5' 6\")   LMP 03/06/2024 (Approximate)   SpO2 98%   BMI 28.13 kg/m    Body mass index is 28.13 kg/m .  Physical Exam   {Exam List (Optional):555827}    {Diagnostic Test Results (Optional):655776}        Signed Electronically by: Juana Chino MD  {Email feedback regarding this note to " "[email or web address]   :133723}      {PROVIDER CHARTING PREFERENCE:630823}    Braydon Bond is a 34 year old, presenting for the following health issues:  Clinic Care Coordination - Initial ([place], had a fever, body rash and eye pain.)      3/11/2024     9:34 AM   Additional Questions   Roomed by Doua   Accompanied by Self         3/11/2024     9:34 AM   Patient Reported Additional Medications   Patient reports taking the following new medications n/a         3/11/2024    Information    services provided? No     HPI     {MA/LPN/RN Pre-Provider Visit Orders- hCG/UA/Strep (Optional):629882}  {SUPERLIST (Optional):762418}  {additonal problems for provider to add (Optional):823874}    {ROS Picklists (Optional):658338}      Objective   /81 (BP Location: Left arm, Patient Position: Sitting, Cuff Size: Adult Large)   Pulse 67   Temp 97.8  F (36.6  C) (Oral)   Resp 16   Ht 1.676 m (5' 6\")   LMP 03/06/2024 (Approximate)   SpO2 98%   BMI 28.13 kg/m    Body mass index is 28.13 kg/m .  Physical Exam   {Exam List (Optional):353444}    {Diagnostic Test Results (Optional):155509}        Signed Electronically by: Juana Chino MD  {Email feedback regarding this note to [email or web address]   :[id number]}      Again, thank you for allowing me to participate in the care of your patient.        Sincerely,        Juana Chino MD  "

## 2024-06-03 ENCOUNTER — OFFICE VISIT (OUTPATIENT)
Dept: FAMILY MEDICINE | Facility: CLINIC | Age: 34
End: 2024-06-03
Payer: COMMERCIAL

## 2024-06-03 VITALS
HEIGHT: 66 IN | HEART RATE: 71 BPM | BODY MASS INDEX: 29.41 KG/M2 | OXYGEN SATURATION: 98 % | DIASTOLIC BLOOD PRESSURE: 75 MMHG | WEIGHT: 183 LBS | SYSTOLIC BLOOD PRESSURE: 130 MMHG | TEMPERATURE: 99.3 F | RESPIRATION RATE: 16 BRPM

## 2024-06-03 DIAGNOSIS — N89.8 VAGINAL IRRITATION: Primary | ICD-10-CM

## 2024-06-03 DIAGNOSIS — N89.8 VAGINAL LESION: ICD-10-CM

## 2024-06-03 PROCEDURE — 99213 OFFICE O/P EST LOW 20 MIN: CPT | Mod: GC | Performed by: STUDENT IN AN ORGANIZED HEALTH CARE EDUCATION/TRAINING PROGRAM

## 2024-06-03 PROCEDURE — 87624 HPV HI-RISK TYP POOLED RSLT: CPT | Performed by: STUDENT IN AN ORGANIZED HEALTH CARE EDUCATION/TRAINING PROGRAM

## 2024-06-03 ASSESSMENT — PATIENT HEALTH QUESTIONNAIRE - PHQ9
SUM OF ALL RESPONSES TO PHQ QUESTIONS 1-9: 4
SUM OF ALL RESPONSES TO PHQ QUESTIONS 1-9: 4
10. IF YOU CHECKED OFF ANY PROBLEMS, HOW DIFFICULT HAVE THESE PROBLEMS MADE IT FOR YOU TO DO YOUR WORK, TAKE CARE OF THINGS AT HOME, OR GET ALONG WITH OTHER PEOPLE: NOT DIFFICULT AT ALL

## 2024-06-03 NOTE — PROGRESS NOTES
"  Assessment & Plan     Vaginal irritation  Vaginal lesion  Appreciated around a 1-1.5cm lesion on the outside portion of the labia minora, and could see a small white head, and some swelling extension to the underside of the labia minora. Suspect this is more of a clogged gland considering the white head - consideration given to HSV (but does not appear like an HSV lesion). We did swab the lesion for HPV given this could also cause a wart - (returned negative). Suspect that this will resolve on own in the next few days, and educated on conservative management with warm compress and sitz bath for now. Return to clinic if not improving by end of week, or if worsening/enlarging.       BMI  Estimated body mass index is 29.54 kg/m  as calculated from the following:    Height as of this encounter: 1.676 m (5' 6\").    Weight as of this encounter: 83 kg (183 lb).       Braydon Bond is a 34 year old, presenting for the following health issues:  Vaginal Problem (Pain in vaginal area for one week. Pain to touch. One sore was found by patient. Patient's partner has herpes )        6/3/2024     3:37 PM   Additional Questions   Roomed by Carlee   Accompanied by self         6/3/2024    Information    services provided? No     HPI     Vaginal Irritation  1 week now of soreness / redness   Felt a mass  Partner has hx of herpes but is on suppressive therapy  NO PAIN with urinate / bowel  NO OVERT SWELLING, but a discrete   NO OTHER ABNORMAL DISCHARGE  Points to the labia minoira           Objective    /75   Pulse 71   Temp 99.3  F (37.4  C) (Oral)   Resp 16   Ht 1.676 m (5' 6\")   Wt 83 kg (183 lb)   LMP 05/13/2024 (Approximate)   SpO2 98%   BMI 29.54 kg/m    Body mass index is 29.54 kg/m .  Physical Exam   GENERAL: alert and no distress   (female) w/bimanual: normal female external genitalia apart from one 1-1.5cm circular lesion/mass on the right labia minor with swelling extension to " the underside - there does appear to be one small white-head at the tip of this mass; no overt swelling of the labia minora nor overt erythema, normal urethral meatus, normal vaginal mucosa, normal cervix/adnexa/uterus without masses or discharge         Signed Electronically by: Darrion Castanon DO    Answers submitted by the patient for this visit:

## 2024-06-07 LAB
HPV HR 12 DNA CVX QL NAA+PROBE: NEGATIVE
HPV16 DNA CVX QL NAA+PROBE: NEGATIVE
HPV18 DNA CVX QL NAA+PROBE: NEGATIVE
HUMAN PAPILLOMA VIRUS FINAL DIAGNOSIS: NORMAL

## 2024-10-02 ENCOUNTER — PATIENT OUTREACH (OUTPATIENT)
Dept: FAMILY MEDICINE | Facility: CLINIC | Age: 34
End: 2024-10-02
Payer: COMMERCIAL

## 2024-10-02 NOTE — TELEPHONE ENCOUNTER
Patient Quality Outreach    Patient is due for the following:   Physical Annual Wellness Visit, Preventive Adult Physical    Next Steps:   Schedule a Adult Preventative    Type of outreach:    Sent [employer] message.      Questions for provider review:               Carlee Marshall MA

## 2025-03-16 ENCOUNTER — HEALTH MAINTENANCE LETTER (OUTPATIENT)
Age: 35
End: 2025-03-16